# Patient Record
Sex: FEMALE | Race: WHITE | NOT HISPANIC OR LATINO | Employment: FULL TIME | URBAN - METROPOLITAN AREA
[De-identification: names, ages, dates, MRNs, and addresses within clinical notes are randomized per-mention and may not be internally consistent; named-entity substitution may affect disease eponyms.]

---

## 2017-06-16 ENCOUNTER — TELEPHONE (OUTPATIENT)
Dept: OBSTETRICS AND GYNECOLOGY | Facility: CLINIC | Age: 47
End: 2017-06-16

## 2017-06-16 NOTE — TELEPHONE ENCOUNTER
----- Message from Joann Castaneda sent at 6/16/2017  2:14 PM CDT -----  Pt would like to get a refill on her birth control call to [facility] on [address]. Pt can be reach at [phone].

## 2017-07-03 ENCOUNTER — PATIENT OUTREACH (OUTPATIENT)
Dept: ADMINISTRATIVE | Facility: HOSPITAL | Age: 47
End: 2017-07-03

## 2017-07-14 ENCOUNTER — OFFICE VISIT (OUTPATIENT)
Dept: OBSTETRICS AND GYNECOLOGY | Facility: CLINIC | Age: 47
End: 2017-07-14
Payer: COMMERCIAL

## 2017-07-14 VITALS
BODY MASS INDEX: 25.12 KG/M2 | HEIGHT: 67 IN | DIASTOLIC BLOOD PRESSURE: 68 MMHG | SYSTOLIC BLOOD PRESSURE: 100 MMHG | WEIGHT: 160.06 LBS

## 2017-07-14 DIAGNOSIS — Z01.419 ENCOUNTER FOR GYNECOLOGICAL EXAMINATION WITHOUT ABNORMAL FINDING: ICD-10-CM

## 2017-07-14 DIAGNOSIS — Z30.09 ENCOUNTER FOR OTHER GENERAL COUNSELING OR ADVICE ON CONTRACEPTION: ICD-10-CM

## 2017-07-14 DIAGNOSIS — Z12.39 BREAST SCREENING: Primary | ICD-10-CM

## 2017-07-14 PROCEDURE — 99999 PR PBB SHADOW E&M-EST. PATIENT-LVL III: CPT | Mod: PBBFAC,,, | Performed by: OBSTETRICS & GYNECOLOGY

## 2017-07-14 PROCEDURE — 99386 PREV VISIT NEW AGE 40-64: CPT | Mod: S$GLB,,, | Performed by: OBSTETRICS & GYNECOLOGY

## 2017-07-14 RX ORDER — ETONOGESTREL AND ETHINYL ESTRADIOL VAGINAL RING .015; .12 MG/D; MG/D
1 RING VAGINAL
Qty: 1 EACH | Refills: 11 | Status: SHIPPED | OUTPATIENT
Start: 2017-07-14 | End: 2018-07-18 | Stop reason: SDUPTHER

## 2017-07-14 NOTE — PROGRESS NOTES
Subjective:       Patient ID: Anita Brock is a 46 y.o. female.    Chief Complaint:  Annual Exam      History of Present Illness  HPI  The patient presents for exam with no complaints, regular menses, no gyn issues  Contraceptive measures are addressed. On Nuvaring with no problems and will continue    Preventive testing reviewed and discussed.  Pap up to date, Mammogram due  Recent history of maternal postmenopausal breast cancer diagnosed         GYN & OB History  Patient's last menstrual period was 2017.   Date of Last Pap: 6/3/2015    OB History    Para Term  AB Living   2 2 2     2   SAB TAB Ectopic Multiple Live Births           2      # Outcome Date GA Lbr Oscar/2nd Weight Sex Delivery Anes PTL Lv   2 Term 04 40w0d  3.912 kg (8 lb 10 oz) M Vag-Spont EPI N DEBORA   1 Term 11/15/98 40w0d  3.997 kg (8 lb 13 oz) M Vag-Spont EPI N DEBORA          Review of Systems  Review of Systems   Constitutional: Negative for appetite change, chills, fatigue, fever and unexpected weight change.   HENT: Negative.    Eyes: Negative for visual disturbance.   Respiratory: Negative for shortness of breath and wheezing.    Cardiovascular: Negative for chest pain, palpitations and leg swelling.   Gastrointestinal: Negative for abdominal pain, bloating, blood in stool, constipation, diarrhea, nausea and vomiting.   Endocrine: Negative for hair loss, hot flashes and hypothyroidism.   Genitourinary: Negative for dyspareunia, dysuria, flank pain, frequency, genital sores, hematuria, menorrhagia, menstrual problem, pelvic pain, urgency, vaginal bleeding, vaginal discharge, dysmenorrhea, urinary incontinence and vaginal odor.   Musculoskeletal: Negative for back pain, joint swelling and myalgias.   Skin:  Negative for rash and hair changes.   Neurological: Negative for syncope and headaches.   Hematological: Negative for adenopathy. Does not bruise/bleed easily.   Psychiatric/Behavioral: Negative for depression  and sleep disturbance. The patient is not nervous/anxious.    Breast: Negative for breast mass, breast pain, nipple discharge and skin changes          Objective:    Physical Exam:   Constitutional: She appears well-developed and well-nourished. No distress.      Neck: No JVD present. No thyroid mass and no thyromegaly present.    Cardiovascular: Normal rate and regular rhythm.     Pulmonary/Chest: Right breast exhibits no inverted nipple, no mass, no nipple discharge, no skin change, no tenderness, no bleeding and no swelling. Left breast exhibits no inverted nipple, no mass, no nipple discharge, no skin change, no tenderness, no bleeding and no swelling.        Abdominal: Soft. Normal appearance and bowel sounds are normal. There is no hepatosplenomegaly. No hernia. Hernia confirmed negative in the ventral area, confirmed negative in the right inguinal area and confirmed negative in the left inguinal area.     Genitourinary: Rectum normal, vagina normal and uterus normal. There is no rash, tenderness, lesion or injury on the right labia. There is no rash, tenderness, lesion or injury on the left labia. Uterus is not deviated, not enlarged, not fixed, not tender and not experiencing uterine prolapse. Cervix is normal. Right adnexum displays no mass, no tenderness and no fullness. Left adnexum displays no mass, no tenderness and no fullness. No erythema, tenderness or bleeding in the vagina. No foreign body in the vagina. No vaginal discharge found. Labial bartholins normal.Cervix exhibits no motion tenderness, no discharge and no friability.                        Assessment:        1. Breast screening    2. Encounter for other general counseling or advice on contraception    3. Encounter for gynecological examination without abnormal finding                Plan:      Anita was seen today for annual exam.    Diagnoses and all orders for this visit:    Breast screening  -     Mammo Digital Screening Bilat with CAD;  Future    Encounter for other general counseling or advice on contraception  -     etonogestrel-ethinyl estradiol (NUVARING) 0.12-0.015 mg/24 hr vaginal ring; Place 1 each vaginally every 28 days.    Encounter for gynecological examination without abnormal finding

## 2017-07-31 ENCOUNTER — HOSPITAL ENCOUNTER (OUTPATIENT)
Dept: RADIOLOGY | Facility: HOSPITAL | Age: 47
Discharge: HOME | End: 2017-07-31
Attending: OBSTETRICS & GYNECOLOGY
Payer: COMMERCIAL

## 2017-07-31 VITALS — BODY MASS INDEX: 25.11 KG/M2 | HEIGHT: 67 IN | WEIGHT: 160 LBS

## 2017-07-31 DIAGNOSIS — Z12.39 BREAST SCREENING: ICD-10-CM

## 2017-07-31 PROCEDURE — 77063 BREAST TOMOSYNTHESIS BI: CPT | Mod: 26,,, | Performed by: RADIOLOGY

## 2017-07-31 PROCEDURE — 77067 SCR MAMMO BI INCL CAD: CPT | Mod: 26,,, | Performed by: RADIOLOGY

## 2017-07-31 PROCEDURE — 77067 SCR MAMMO BI INCL CAD: CPT | Mod: TC

## 2018-07-18 DIAGNOSIS — Z30.09 ENCOUNTER FOR OTHER GENERAL COUNSELING OR ADVICE ON CONTRACEPTION: ICD-10-CM

## 2018-07-18 DIAGNOSIS — Z12.39 BREAST CANCER SCREENING: Primary | ICD-10-CM

## 2018-07-18 RX ORDER — ETONOGESTREL AND ETHINYL ESTRADIOL VAGINAL RING .015; .12 MG/D; MG/D
1 RING VAGINAL
Qty: 1 EACH | Refills: 11 | Status: SHIPPED | OUTPATIENT
Start: 2018-07-18 | End: 2018-08-07 | Stop reason: SDUPTHER

## 2018-07-18 NOTE — TELEPHONE ENCOUNTER
Spoke with pt. Pt requesting refill on Nuvaring. Last annual 7/14/17. Scheduled for mammogram and annual with Dr. JAIME Frey for 8/7/18. Pharmacy verified. Orders pended. Please advise.

## 2018-08-07 ENCOUNTER — HOSPITAL ENCOUNTER (OUTPATIENT)
Dept: RADIOLOGY | Facility: HOSPITAL | Age: 48
Discharge: HOME | End: 2018-08-07
Attending: OBSTETRICS & GYNECOLOGY
Payer: COMMERCIAL

## 2018-08-07 ENCOUNTER — OFFICE VISIT (OUTPATIENT)
Dept: OBSTETRICS AND GYNECOLOGY | Facility: CLINIC | Age: 48
End: 2018-08-07
Payer: COMMERCIAL

## 2018-08-07 VITALS
BODY MASS INDEX: 25.11 KG/M2 | HEIGHT: 67 IN | HEIGHT: 67 IN | WEIGHT: 160 LBS | BODY MASS INDEX: 25.74 KG/M2 | DIASTOLIC BLOOD PRESSURE: 62 MMHG | SYSTOLIC BLOOD PRESSURE: 116 MMHG | WEIGHT: 164 LBS

## 2018-08-07 DIAGNOSIS — Z12.39 BREAST CANCER SCREENING: ICD-10-CM

## 2018-08-07 DIAGNOSIS — Z30.09 ENCOUNTER FOR OTHER GENERAL COUNSELING OR ADVICE ON CONTRACEPTION: ICD-10-CM

## 2018-08-07 DIAGNOSIS — Z12.4 PAP SMEAR FOR CERVICAL CANCER SCREENING: Primary | ICD-10-CM

## 2018-08-07 DIAGNOSIS — Z01.419 ENCOUNTER FOR GYNECOLOGICAL EXAMINATION WITHOUT ABNORMAL FINDING: ICD-10-CM

## 2018-08-07 PROCEDURE — 88175 CYTOPATH C/V AUTO FLUID REDO: CPT

## 2018-08-07 PROCEDURE — 77063 BREAST TOMOSYNTHESIS BI: CPT | Mod: TC

## 2018-08-07 PROCEDURE — 99396 PREV VISIT EST AGE 40-64: CPT | Mod: S$GLB,,, | Performed by: OBSTETRICS & GYNECOLOGY

## 2018-08-07 PROCEDURE — 77063 BREAST TOMOSYNTHESIS BI: CPT | Mod: 26,,, | Performed by: RADIOLOGY

## 2018-08-07 PROCEDURE — 77067 SCR MAMMO BI INCL CAD: CPT | Mod: TC

## 2018-08-07 PROCEDURE — 99999 PR PBB SHADOW E&M-EST. PATIENT-LVL III: CPT | Mod: PBBFAC,,, | Performed by: OBSTETRICS & GYNECOLOGY

## 2018-08-07 PROCEDURE — 77067 SCR MAMMO BI INCL CAD: CPT | Mod: 26,,, | Performed by: RADIOLOGY

## 2018-08-07 RX ORDER — ETONOGESTREL AND ETHINYL ESTRADIOL VAGINAL RING .015; .12 MG/D; MG/D
1 RING VAGINAL
Qty: 1 EACH | Refills: 11 | Status: SHIPPED | OUTPATIENT
Start: 2018-08-07 | End: 2019-08-20 | Stop reason: SDUPTHER

## 2018-08-07 NOTE — PROGRESS NOTES
Subjective:       Patient ID: Anita Brock is a 47 y.o. female.    Chief Complaint:  Annual Exam      History of Present Illness  HPI  The patient presents for exam with no complaints, regular menses, no gyn issues  Contraceptive measures are addressed. On Nuvaring for years, doing well, will continue  Preventive testing reviewed and discussed.        GYN & OB History  Patient's last menstrual period was 2018 (approximate).   Date of Last Pap: 6/3/2015    OB History    Para Term  AB Living   2 2 2     2   SAB TAB Ectopic Multiple Live Births           2      # Outcome Date GA Lbr Oscar/2nd Weight Sex Delivery Anes PTL Lv   2 Term 04 40w0d  3.912 kg (8 lb 10 oz) M Vag-Spont EPI N DEBORA   1 Term 11/15/98 40w0d  3.997 kg (8 lb 13 oz) M Vag-Spont EPI N DEBORA          Review of Systems  Review of Systems   Constitutional: Negative for appetite change, chills, fatigue, fever and unexpected weight change.   HENT: Negative.    Eyes: Negative for visual disturbance.   Respiratory: Negative for shortness of breath and wheezing.    Cardiovascular: Negative for chest pain, palpitations and leg swelling.   Gastrointestinal: Negative for abdominal pain, bloating, blood in stool, constipation, diarrhea, nausea and vomiting.   Endocrine: Negative for hair loss, hot flashes and hypothyroidism.   Genitourinary: Negative for dyspareunia, dysuria, flank pain, frequency, genital sores, hematuria, menorrhagia, menstrual problem, pelvic pain, urgency, vaginal bleeding, vaginal discharge, dysmenorrhea, urinary incontinence and vaginal odor.   Musculoskeletal: Negative for back pain, joint swelling and myalgias.   Skin:  Negative for rash and hair changes.   Neurological: Negative for syncope and headaches.   Hematological: Negative for adenopathy. Does not bruise/bleed easily.   Psychiatric/Behavioral: Negative for depression and sleep disturbance. The patient is not nervous/anxious.    Breast: Negative for  breast mass, breast pain, nipple discharge and skin changes          Objective:    Physical Exam:   Constitutional: She appears well-developed and well-nourished. No distress.      Neck: No JVD present. No thyroid mass and no thyromegaly present.    Cardiovascular: Normal rate and regular rhythm.          Abdominal: Soft. Normal appearance and bowel sounds are normal. There is no hepatosplenomegaly. No hernia. Hernia confirmed negative in the ventral area, confirmed negative in the right inguinal area and confirmed negative in the left inguinal area.     Genitourinary: Rectum normal, vagina normal and uterus normal. There is no rash, tenderness, lesion or injury on the right labia. There is no rash, tenderness, lesion or injury on the left labia. Uterus is not deviated, not enlarged, not fixed, not tender and not experiencing uterine prolapse. Cervix is normal. Right adnexum displays no mass, no tenderness and no fullness. Left adnexum displays no mass, no tenderness and no fullness. No erythema, tenderness or bleeding in the vagina. No foreign body in the vagina. No vaginal discharge found. Labial bartholins normal.Cervix exhibits no motion tenderness, no discharge and no friability. Additional cervical findings: pap smear done                       Assessment:        1. Pap smear for cervical cancer screening    2. Encounter for other general counseling or advice on contraception    3. Encounter for gynecological examination without abnormal finding                Plan:      Anita was seen today for annual exam.    Diagnoses and all orders for this visit:    Pap smear for cervical cancer screening  -     Liquid-based pap smear, screening    Encounter for other general counseling or advice on contraception  -     etonogestrel-ethinyl estradiol (NUVARING) 0.12-0.015 mg/24 hr vaginal ring; Place 1 each vaginally every 28 days.    Encounter for gynecological examination without abnormal finding

## 2019-06-26 ENCOUNTER — TELEPHONE (OUTPATIENT)
Dept: OBSTETRICS AND GYNECOLOGY | Facility: CLINIC | Age: 49
End: 2019-06-26

## 2019-06-26 NOTE — TELEPHONE ENCOUNTER
----- Message from Solitario Jose sent at 6/26/2019  3:37 PM CDT -----  Contact: self [phone]  PT would like to schedule appt with Dr. David Frey. Please call back at [phone].  Md Toney

## 2019-08-06 ENCOUNTER — TELEPHONE (OUTPATIENT)
Dept: OBSTETRICS AND GYNECOLOGY | Facility: CLINIC | Age: 49
End: 2019-08-06

## 2019-08-06 DIAGNOSIS — Z12.39 BREAST CANCER SCREENING: Primary | ICD-10-CM

## 2019-08-06 NOTE — TELEPHONE ENCOUNTER
Spoke to patient and scheduled her appointment for her mammogram and annual. Patient verbalized understanding to dates, times, and location.

## 2019-08-06 NOTE — TELEPHONE ENCOUNTER
----- Message from Sonya Sutherland sent at 8/6/2019  8:25 AM CDT -----  Contact: self  Patient requesting a call back to schedule WWE with Dr. JEREMY Frey. Please call patient back at [phone]

## 2019-08-09 ENCOUNTER — TELEPHONE (OUTPATIENT)
Dept: OBSTETRICS AND GYNECOLOGY | Facility: CLINIC | Age: 49
End: 2019-08-09

## 2019-08-09 NOTE — TELEPHONE ENCOUNTER
----- Message from Izzy Mccall sent at 8/9/2019  9:26 AM CDT -----  Contact: pt   Call pt in regards to rescheduling her appt.    [phone] (home)

## 2019-08-20 ENCOUNTER — OFFICE VISIT (OUTPATIENT)
Dept: OBSTETRICS AND GYNECOLOGY | Facility: CLINIC | Age: 49
End: 2019-08-20
Payer: COMMERCIAL

## 2019-08-20 VITALS
WEIGHT: 163.38 LBS | SYSTOLIC BLOOD PRESSURE: 130 MMHG | DIASTOLIC BLOOD PRESSURE: 72 MMHG | HEIGHT: 67 IN | BODY MASS INDEX: 25.64 KG/M2

## 2019-08-20 DIAGNOSIS — Z30.09 ENCOUNTER FOR OTHER GENERAL COUNSELING OR ADVICE ON CONTRACEPTION: ICD-10-CM

## 2019-08-20 DIAGNOSIS — Z01.419 ENCOUNTER FOR GYNECOLOGICAL EXAMINATION WITHOUT ABNORMAL FINDING: Primary | ICD-10-CM

## 2019-08-20 PROCEDURE — 99999 PR PBB SHADOW E&M-EST. PATIENT-LVL II: ICD-10-PCS | Mod: PBBFAC,,, | Performed by: OBSTETRICS & GYNECOLOGY

## 2019-08-20 PROCEDURE — 99396 PREV VISIT EST AGE 40-64: CPT | Mod: S$GLB,,, | Performed by: OBSTETRICS & GYNECOLOGY

## 2019-08-20 PROCEDURE — 99999 PR PBB SHADOW E&M-EST. PATIENT-LVL II: CPT | Mod: PBBFAC,,, | Performed by: OBSTETRICS & GYNECOLOGY

## 2019-08-20 PROCEDURE — 99396 PR PREVENTIVE VISIT,EST,40-64: ICD-10-PCS | Mod: S$GLB,,, | Performed by: OBSTETRICS & GYNECOLOGY

## 2019-08-20 RX ORDER — ETONOGESTREL AND ETHINYL ESTRADIOL VAGINAL RING .015; .12 MG/D; MG/D
1 RING VAGINAL
Qty: 1 EACH | Refills: 11 | Status: SHIPPED | OUTPATIENT
Start: 2019-08-20 | End: 2020-09-02 | Stop reason: SDUPTHER

## 2019-08-20 NOTE — PROGRESS NOTES
Subjective:       Patient ID: Anita Brock is a 48 y.o. female.    Chief Complaint:  Well Woman      History of Present Illness  HPI  The patient presents for exam with no complaints, regular menses, no gyn issues  Contraceptive measures are addressed.   Preventive testing reviewed and discussed.      Health Maintenance   Topic Date Due    Lipid Panel  1972    TETANUS VACCINE  1990    Mammogram  2020    Pap Smear with HPV Cotest  2021     GYN & OB History  Patient's last menstrual period was 2019.   Date of Last Pap: 2018    OB History    Para Term  AB Living   2 2 2     2   SAB TAB Ectopic Multiple Live Births           2      # Outcome Date GA Lbr Oscar/2nd Weight Sex Delivery Anes PTL Lv   2 Term 04 40w0d  3.912 kg (8 lb 10 oz) M Vag-Spont EPI N DEBORA   1 Term 11/15/98 40w0d  3.997 kg (8 lb 13 oz) M Vag-Spont EPI N DEBORA       Review of Systems  Review of Systems   Constitutional: Negative for appetite change, chills, fatigue, fever and unexpected weight change.   HENT: Negative.    Eyes: Negative for visual disturbance.   Respiratory: Negative for shortness of breath and wheezing.    Cardiovascular: Negative for chest pain, palpitations and leg swelling.   Gastrointestinal: Negative for abdominal pain, bloating, blood in stool, constipation, diarrhea, nausea and vomiting.   Endocrine: Negative for hair loss, hot flashes and hypothyroidism.   Genitourinary: Negative for dysmenorrhea, dyspareunia, dysuria, flank pain, frequency, genital sores, hematuria, menorrhagia, menstrual problem, pelvic pain, urgency, vaginal bleeding, vaginal discharge, urinary incontinence and vaginal odor.   Musculoskeletal: Negative for back pain, joint swelling and myalgias.   Integumentary:  Negative for rash, hair changes, breast mass, nipple discharge and breast skin changes.   Neurological: Negative for syncope and headaches.   Hematological: Negative for adenopathy. Does  not bruise/bleed easily.   Psychiatric/Behavioral: Negative for depression and sleep disturbance. The patient is not nervous/anxious.    Breast: Negative for mass, mastodynia, nipple discharge and skin changes          Objective:   Physical Exam:   Constitutional: She appears well-developed and well-nourished. No distress.      Neck: No JVD present. No thyroid mass and no thyromegaly present.    Cardiovascular: Normal rate and regular rhythm.          Abdominal: Soft. Normal appearance and bowel sounds are normal. There is no hepatosplenomegaly. No hernia. Hernia confirmed negative in the ventral area, confirmed negative in the right inguinal area and confirmed negative in the left inguinal area.     Genitourinary: Rectum normal, vagina normal and uterus normal. There is no rash, tenderness, lesion or injury on the right labia. There is no rash, tenderness, lesion or injury on the left labia. Uterus is not deviated, not enlarged, not fixed, not tender and not experiencing uterine prolapse. Cervix is normal. Right adnexum displays no mass, no tenderness and no fullness. Left adnexum displays no mass, no tenderness and no fullness. No erythema, tenderness or bleeding in the vagina. No foreign body in the vagina. No vaginal discharge found. Labial bartholins normal.Cervix exhibits no motion tenderness, no discharge and no friability.                      Assessment:        1. Encounter for gynecological examination without abnormal finding    2. Encounter for other general counseling or advice on contraception                Plan:            Anita was seen today for well woman.    Diagnoses and all orders for this visit:    Encounter for gynecological examination without abnormal finding    Encounter for other general counseling or advice on contraception  -     etonogestrel-ethinyl estradiol (NUVARING) 0.12-0.015 mg/24 hr vaginal ring; Place 1 each vaginally every 28 days.

## 2019-09-24 ENCOUNTER — HOSPITAL ENCOUNTER (OUTPATIENT)
Dept: RADIOLOGY | Facility: HOSPITAL | Age: 49
Discharge: HOME | End: 2019-09-24
Attending: OBSTETRICS & GYNECOLOGY
Payer: COMMERCIAL

## 2019-09-24 DIAGNOSIS — Z12.39 BREAST CANCER SCREENING: ICD-10-CM

## 2019-09-24 PROCEDURE — 77067 SCR MAMMO BI INCL CAD: CPT | Mod: 26,,, | Performed by: RADIOLOGY

## 2019-09-24 PROCEDURE — 77063 BREAST TOMOSYNTHESIS BI: CPT | Mod: 26,,, | Performed by: RADIOLOGY

## 2019-09-24 PROCEDURE — 77063 MAMMO DIGITAL SCREENING BILAT WITH TOMOSYNTHESIS_CAD: ICD-10-PCS | Mod: 26,,, | Performed by: RADIOLOGY

## 2019-09-24 PROCEDURE — 77067 SCR MAMMO BI INCL CAD: CPT | Mod: TC

## 2019-09-24 PROCEDURE — 77067 MAMMO DIGITAL SCREENING BILAT WITH TOMOSYNTHESIS_CAD: ICD-10-PCS | Mod: 26,,, | Performed by: RADIOLOGY

## 2020-09-02 ENCOUNTER — TELEPHONE (OUTPATIENT)
Dept: OBSTETRICS AND GYNECOLOGY | Facility: CLINIC | Age: 50
End: 2020-09-02

## 2020-09-02 ENCOUNTER — OFFICE VISIT (OUTPATIENT)
Dept: OBSTETRICS AND GYNECOLOGY | Facility: CLINIC | Age: 50
End: 2020-09-02
Payer: COMMERCIAL

## 2020-09-02 VITALS
DIASTOLIC BLOOD PRESSURE: 82 MMHG | WEIGHT: 160.94 LBS | HEIGHT: 66 IN | SYSTOLIC BLOOD PRESSURE: 124 MMHG | BODY MASS INDEX: 25.86 KG/M2

## 2020-09-02 DIAGNOSIS — Z80.3 FAMILY HISTORY OF BREAST CANCER IN MOTHER: ICD-10-CM

## 2020-09-02 DIAGNOSIS — Z12.31 VISIT FOR SCREENING MAMMOGRAM: Primary | ICD-10-CM

## 2020-09-02 DIAGNOSIS — Z30.09 ENCOUNTER FOR OTHER GENERAL COUNSELING OR ADVICE ON CONTRACEPTION: ICD-10-CM

## 2020-09-02 PROCEDURE — 99999 PR PBB SHADOW E&M-EST. PATIENT-LVL III: ICD-10-PCS | Mod: PBBFAC,,, | Performed by: OBSTETRICS & GYNECOLOGY

## 2020-09-02 PROCEDURE — 3008F PR BODY MASS INDEX (BMI) DOCUMENTED: ICD-10-PCS | Mod: CPTII,S$GLB,, | Performed by: OBSTETRICS & GYNECOLOGY

## 2020-09-02 PROCEDURE — 99396 PREV VISIT EST AGE 40-64: CPT | Mod: S$GLB,,, | Performed by: OBSTETRICS & GYNECOLOGY

## 2020-09-02 PROCEDURE — 99396 PR PREVENTIVE VISIT,EST,40-64: ICD-10-PCS | Mod: S$GLB,,, | Performed by: OBSTETRICS & GYNECOLOGY

## 2020-09-02 PROCEDURE — 3008F BODY MASS INDEX DOCD: CPT | Mod: CPTII,S$GLB,, | Performed by: OBSTETRICS & GYNECOLOGY

## 2020-09-02 PROCEDURE — 99999 PR PBB SHADOW E&M-EST. PATIENT-LVL III: CPT | Mod: PBBFAC,,, | Performed by: OBSTETRICS & GYNECOLOGY

## 2020-09-02 RX ORDER — ETONOGESTREL AND ETHINYL ESTRADIOL VAGINAL RING .015; .12 MG/D; MG/D
1 RING VAGINAL
Qty: 1 EACH | Refills: 11 | Status: SHIPPED | OUTPATIENT
Start: 2020-09-02 | End: 2021-09-03 | Stop reason: SDUPTHER

## 2020-09-02 NOTE — TELEPHONE ENCOUNTER
----- Message from Anamika Mcgrath LPN sent at 9/2/2020  1:25 PM CDT -----  done  ----- Message -----  From: Kaylee Villanueva LPN  Sent: 9/2/2020  11:42 AM CDT  To: [facility] Staff    Dr. David Frey would like this pt to see [place] for increased risk of breast cancer.  Can someone assist with making an appointment for her?  The pt has a screening mammo scheduled for 9/24/2020, but does not know if she should keep that appointment, or will something different be ordered.  Please advise.  Thanks in advance, Kaylin

## 2020-09-02 NOTE — PROGRESS NOTES
Subjective:       Patient ID: Anita Brock is a 50 y.o. female.    Chief Complaint:  Gynecologic Exam (annual exam and has no complaints)      History of Present Illness  HPI  The patient presents for exam with no complaints, regular menses, no gyn issues  Contraceptive measures are addressed. On Nuvaring and will continue  Preventive testing reviewed and discussed.  Discussed increase risk of breast cancer as calculated with TC score of 24 % over the past 3 screenings.  Only identifiable risk is maternal history of stage 0 breast cancer post menopause        Health Maintenance   Topic Date Due    Hepatitis C Screening  1972    Lipid Panel  1972    TETANUS VACCINE  1990    Mammogram  2021     GYN & OB History  Patient's last menstrual period was 2020 (approximate).   Date of Last Pap: 2018    OB History    Para Term  AB Living   2 2 2     2   SAB TAB Ectopic Multiple Live Births           2      # Outcome Date GA Lbr Oscar/2nd Weight Sex Delivery Anes PTL Lv   2 Term 04 40w0d  3.912 kg (8 lb 10 oz) M Vag-Spont EPI N DEBORA   1 Term 11/15/98 40w0d  3.997 kg (8 lb 13 oz) M Vag-Spont EPI N DEBORA       Review of Systems  Review of Systems   Constitutional: Negative for appetite change, chills, fatigue, fever and unexpected weight change.   HENT: Negative.    Eyes: Negative for visual disturbance.   Respiratory: Negative for shortness of breath and wheezing.    Cardiovascular: Negative for chest pain, palpitations and leg swelling.   Gastrointestinal: Negative for abdominal pain, bloating, blood in stool, constipation, diarrhea, nausea and vomiting.   Endocrine: Negative for hair loss, hot flashes and hypothyroidism.   Genitourinary: Negative for dysmenorrhea, dyspareunia, dysuria, flank pain, frequency, genital sores, hematuria, menorrhagia, menstrual problem, pelvic pain, urgency, vaginal bleeding, vaginal discharge, urinary incontinence and vaginal odor.    Musculoskeletal: Negative for back pain, joint swelling and myalgias.   Integumentary:  Negative for rash, hair changes, breast mass, nipple discharge and breast skin changes.   Neurological: Negative for syncope and headaches.   Hematological: Negative for adenopathy. Does not bruise/bleed easily.   Psychiatric/Behavioral: Negative for depression and sleep disturbance. The patient is not nervous/anxious.    Breast: Negative for mass, mastodynia, nipple discharge and skin changes          Objective:   Physical Exam:   Constitutional: She appears well-developed and well-nourished. No distress.      Neck: No JVD present. No thyroid mass and no thyromegaly present.    Cardiovascular: Normal rate and regular rhythm.          Abdominal: Soft. Normal appearance and bowel sounds are normal. No hernia. Hernia confirmed negative in the ventral area, confirmed negative in the right inguinal area and confirmed negative in the left inguinal area.     Genitourinary:    Vagina, uterus and rectum normal.   There is no rash, tenderness, lesion or injury on the right labia. There is no rash, tenderness, lesion or injury on the left labia. Uterus is not deviated, not enlarged, not fixed, not tender and not experiencing uterine prolapse. Cervix is normal. Right adnexum displays no mass, no tenderness and no fullness. Left adnexum displays no mass, no tenderness and no fullness. No erythema, tenderness or bleeding in the vagina.    No foreign body in the vagina.   Labial bartholins normal.Cervix exhibits no motion tenderness, no discharge and no friability. negative for vaginal discharge                     Assessment:        1. Visit for screening mammogram    2. Encounter for other general counseling or advice on contraception    3. Family history of breast cancer in mother                Plan:            Anita was seen today for gynecologic exam.    Diagnoses and all orders for this visit:    Visit for screening mammogram  -      Mammo Digital Screening Bilat w/ [place]; Future    Encounter for other general counseling or advice on contraception  -     etonogestreL-ethinyl estradioL (NUVARING) 0.12-0.015 mg/24 hr vaginal ring; Place 1 each vaginally every 28 days.    Family history of breast cancer in mother  Comments:  TC risk 24 % over the past 3 screening exams   Advise to visit with Breast Screening clinic to discuss future screening options.

## 2020-09-02 NOTE — TELEPHONE ENCOUNTER
Pt informed and voiced understanding of date, time and location of appt with Nahomy Beckhamcott.  PAULA, ERINN

## 2020-09-24 ENCOUNTER — HOSPITAL ENCOUNTER (OUTPATIENT)
Dept: RADIOLOGY | Facility: HOSPITAL | Age: 50
Discharge: HOME | End: 2020-09-24
Attending: OBSTETRICS & GYNECOLOGY
Payer: COMMERCIAL

## 2020-09-24 VITALS — HEIGHT: 66 IN | BODY MASS INDEX: 25.86 KG/M2 | WEIGHT: 160.94 LBS

## 2020-09-24 DIAGNOSIS — Z12.31 VISIT FOR SCREENING MAMMOGRAM: ICD-10-CM

## 2020-09-24 PROCEDURE — 77063 MAMMO DIGITAL SCREENING BILAT WITH TOMOSYNTHESIS_CAD: ICD-10-PCS | Mod: 26,,, | Performed by: RADIOLOGY

## 2020-09-24 PROCEDURE — 77067 SCR MAMMO BI INCL CAD: CPT | Mod: 26,,, | Performed by: RADIOLOGY

## 2020-09-24 PROCEDURE — 77067 SCR MAMMO BI INCL CAD: CPT | Mod: TC

## 2020-09-24 PROCEDURE — 77067 MAMMO DIGITAL SCREENING BILAT WITH TOMOSYNTHESIS_CAD: ICD-10-PCS | Mod: 26,,, | Performed by: RADIOLOGY

## 2020-09-24 PROCEDURE — 77063 BREAST TOMOSYNTHESIS BI: CPT | Mod: 26,,, | Performed by: RADIOLOGY

## 2020-09-28 ENCOUNTER — HOSPITAL ENCOUNTER (OUTPATIENT)
Dept: RADIOLOGY | Facility: HOSPITAL | Age: 50
Discharge: HOME | End: 2020-09-28
Attending: OBSTETRICS & GYNECOLOGY
Payer: COMMERCIAL

## 2020-09-28 DIAGNOSIS — R92.8 ABNORMAL MAMMOGRAM: ICD-10-CM

## 2020-09-28 PROCEDURE — 76642 ULTRASOUND BREAST LIMITED: CPT | Mod: TC,LT

## 2020-09-28 PROCEDURE — 76642 US BREAST LEFT LIMITED: ICD-10-PCS | Mod: 26,LT,, | Performed by: RADIOLOGY

## 2020-09-28 PROCEDURE — 76642 ULTRASOUND BREAST LIMITED: CPT | Mod: 26,LT,, | Performed by: RADIOLOGY

## 2020-09-29 ENCOUNTER — PATIENT MESSAGE (OUTPATIENT)
Dept: OBSTETRICS AND GYNECOLOGY | Facility: CLINIC | Age: 50
End: 2020-09-29

## 2020-10-15 NOTE — PROGRESS NOTES
Patient ID: Anita Brock is a 50 y.o. female.    Chief Complaint: elevated risk for breast cancer      HPI: Patient presents for the evaluation of an elevated breast cancer risk assessment score of 23.83%. Pt is referred by David Frey MD    Risk factors identified:     Menarche at 15  G 2 P 2  First pregnancy at 28  LMP: 9/20/2020  Estrogen: none  Radiation to the neck or chest wall- none  Prior breast biopsies or atypical hyperplasia- none    ETOH: glass a wine once a week     FH: mother breast cancer - Stage 0 - 71 y/o, son leukemia at 6 y/o, maternal uncle colon cancer- 60's      Body mass index is 25.83 kg/m².    Review of Systems   Constitutional: Negative.    HENT: Negative.    Eyes: Negative.    Respiratory: Negative.    Cardiovascular: Negative.    Gastrointestinal: Negative.         No reflux   Endocrine: Negative.    Genitourinary: Negative.    Musculoskeletal: Negative.    Skin: Negative.    Allergic/Immunologic: Negative.    Neurological: Negative.    Hematological: Negative.  Negative for adenopathy.   Psychiatric/Behavioral: Negative.      Breast: Pt denies any breast pain, nipple discharge, or palpable mass. No prior trauma or bruising. No breast surgeries or abnormalities.    Current Outpatient Medications   Medication Sig Dispense Refill    etonogestreL-ethinyl estradioL (NUVARING) 0.12-0.015 mg/24 hr vaginal ring Place 1 each vaginally every 28 days. 1 each 11     No current facility-administered medications for this visit.        Review of patient's allergies indicates:  No Known Allergies    History reviewed. No pertinent past medical history.    Past Surgical History:   Procedure Laterality Date    Dislocated Left Patella         Family History   Problem Relation Age of Onset    Leukemia Son     Hypertension Mother     Breast cancer Mother     Thrombosis Mother         DVT in leg    Colon cancer Maternal Uncle     Ovarian cancer Neg Hx        Social History      Socioeconomic History    Marital status:      Spouse name: Not on file    Number of children: Not on file    Years of education: Not on file    Highest education level: Not on file   Occupational History    Not on file   Social Needs    Financial resource strain: Not on file    Food insecurity     Worry: Not on file     Inability: Not on file    Transportation needs     Medical: Not on file     Non-medical: Not on file   Tobacco Use    Smoking status: Never Smoker    Smokeless tobacco: Never Used   Substance and Sexual Activity    Alcohol use: Yes     Comment: socially    Drug use: No    Sexual activity: Yes     Partners: Male     Birth control/protection: Inserts   Lifestyle    Physical activity     Days per week: Not on file     Minutes per session: Not on file    Stress: Not on file   Relationships    Social connections     Talks on phone: Not on file     Gets together: Not on file     Attends [employer] service: Not on file     Active member of club or organization: Not on file     Attends meetings of clubs or organizations: Not on file     Relationship status: Not on file   Other Topics Concern    Not on file   Social History Narrative    Not on file       Vitals:    10/20/20 0709   BP: 120/63   Pulse: 76   Resp: 18   Temp: 97.1 °F (36.2 °C)       Physical Exam  Constitutional:       Appearance: She is well-developed.   HENT:      Head: Normocephalic and atraumatic.      Right Ear: External ear normal.      Left Ear: External ear normal.      Mouth/Throat:      Pharynx: No oropharyngeal exudate.   Eyes:      General: No scleral icterus.        Right eye: No discharge.         Left eye: No discharge.      Conjunctiva/sclera: Conjunctivae normal.      Pupils: Pupils are equal, round, and reactive to light.   Neck:      Musculoskeletal: Normal range of motion and neck supple.      Thyroid: No thyromegaly.   Cardiovascular:      Rate and Rhythm: Normal rate and regular rhythm.      Heart  sounds: Normal heart sounds.   Pulmonary:      Effort: Pulmonary effort is normal.      Breath sounds: Normal breath sounds.   Chest:      Breasts:         Right: No inverted nipple, mass, nipple discharge, skin change or tenderness.         Left: No inverted nipple, mass, nipple discharge, skin change or tenderness.       Abdominal:      General: Bowel sounds are normal.      Palpations: Abdomen is soft.   Musculoskeletal: Normal range of motion.      Right shoulder: She exhibits no crepitus and normal strength.   Lymphadenopathy:      Head:      Right side of head: No submental, submandibular, tonsillar, preauricular, posterior auricular or occipital adenopathy.      Left side of head: No submental, submandibular, tonsillar, preauricular, posterior auricular or occipital adenopathy.      Cervical: No cervical adenopathy.      Right cervical: No superficial or posterior cervical adenopathy.     Left cervical: No superficial or posterior cervical adenopathy.      Upper Body:      Right upper body: No supraclavicular or axillary adenopathy.      Left upper body: No supraclavicular or axillary adenopathy.   Skin:     General: Skin is warm and dry.      Coloration: Skin is not pale.      Findings: No erythema or rash.   Neurological:      Mental Status: She is alert and oriented to person, place, and time.      Deep Tendon Reflexes: Reflexes are normal and symmetric.   Psychiatric:         Behavior: Behavior normal.         Thought Content: Thought content normal.         Judgment: Judgment normal.         9/28/2020  US Breast Left Limited  Narrative: Result:   US Breast Left Limited     History:  Patient is 50 y.o. and is seen for a diagnostic mammogram.    Films Compared:  Compared to: 09/24/2020 Mammo Digital Screening Bilat w/ Ridge     Findings:     There is an 8 mm x 4 mm x 8 mm simple cyst seen in the left breast at 12   o'clock. The cyst correlates with the prior mammogram finding.     There is no evidence of  suspicious masses or other abnormal findings in   the left breast.  Impression: There is no sonographic evidence of malignancy in the left breast.    BI-RADS Category:   Overall: 2 - Benign     Recommendation:  Routine screening mammogram in 1 year is recommended.    Your estimated lifetime risk of breast cancer (to age 85) based on   Tyrer-Cuzick risk assessment model is Tyrer-Cuzick: 23.83 %. According to   the American Cancer Society, patients with a lifetime breast cancer risk   of 20% or higher might benefit from supplemental screening tests.         Assessment & Plan:  Family history of breast cancer    At high risk for breast cancer  -     MRI Breast w/wo Contrast, w/CAD, Bilateral; Future; Expected date: 04/15/2021  -     Basic Metabolic Panel; Future; Expected date: 04/20/2021    Counseling and coordination of care    Counseling on health promotion and disease prevention    Encounter for breast cancer screening using non-mammogram modality  -     MRI Breast w/wo Contrast, w/CAD, Bilateral; Future; Expected date: 04/15/2021  -     Basic Metabolic Panel; Future; Expected date: 04/20/2021    Health education/counseling    Breast mass, right  -     US Breast Right Limited; Future; Expected date: 11/03/2020  -     Mammo Digital Diagnostic Right with Ridge; Future; Expected date: 11/03/2020      1. Right breast palpable prominence that is asymmetric to the left same locatioin.  2. Negative imaging- simple cyst left breast- elevated risk for breast cancer  3. Explained results of risk assessment and recommendations for additional screening with MRI in 6 months alternating with Diagnostic mammograms. Recommend right breast ultrasound in the 6 oclock position for further eval of the prominence- will notify pt of results  4. Next imaging due: March 2021 bilateral breast MRI with and without contrast and exam  5. Discussed modifiable risk factors such as diet and exercise. Reviewed diet and counseled pt regarding  eating more fruits and vegetables throughout the day.  6. Diet/Weight goals- 5 # loss by March 2021  7. Exercise:none- will try to get 60 minutes a week  8. Encouraged 30 minutes most days of the week. Explained benefits of losing a few pounds to decrease estrogen exposure from fat cells.  9. Discussed risks vs benefits of genetic testing.  - Indications for testing: not indicated at this time due to limited family history of breast cancer. Variant of undetermined significance - usually does not change follow-up recommendations or screenings unless reclassified in the future as a definitive mutation.   10. Discussed use of tamoxifen for the reduction of breast cancer risk- Information regarding risk reducing medications given to pt verbally and in writing. Pt declines at this time due to the potential for hot flashes and blood clots. Risk out weigh benefit of taking med at this time due to low score  11. BSE technique was demonstrated and explained. Related what to look and feel for on exam monthly. Pt verbalized understanding and return demonstrated proper technique and knowledge of what to look for on self-exam. Pt instructed to call if notes any changes. Contact information given.   One hour- 60 minutes was spent with this patient and 75% was spent identifying risk factors, reviewing records and educating pt regarding risk reduction strategies and planning future screenings.            Information about strategies to decrease breast cancer risk factors from Up to Date given to the pt.

## 2020-10-20 ENCOUNTER — OFFICE VISIT (OUTPATIENT)
Dept: HEMATOLOGY/ONCOLOGY | Facility: CLINIC | Age: 50
End: 2020-10-20
Payer: COMMERCIAL

## 2020-10-20 VITALS
TEMPERATURE: 97 F | HEIGHT: 66 IN | SYSTOLIC BLOOD PRESSURE: 120 MMHG | HEART RATE: 76 BPM | OXYGEN SATURATION: 99 % | BODY MASS INDEX: 25.72 KG/M2 | WEIGHT: 160.06 LBS | DIASTOLIC BLOOD PRESSURE: 63 MMHG | RESPIRATION RATE: 18 BRPM

## 2020-10-20 DIAGNOSIS — Z71.9 HEALTH EDUCATION/COUNSELING: ICD-10-CM

## 2020-10-20 DIAGNOSIS — Z91.89 AT HIGH RISK FOR BREAST CANCER: ICD-10-CM

## 2020-10-20 DIAGNOSIS — Z12.39 ENCOUNTER FOR BREAST CANCER SCREENING USING NON-MAMMOGRAM MODALITY: ICD-10-CM

## 2020-10-20 DIAGNOSIS — N63.10 BREAST MASS, RIGHT: ICD-10-CM

## 2020-10-20 DIAGNOSIS — Z71.89 COUNSELING ON HEALTH PROMOTION AND DISEASE PREVENTION: ICD-10-CM

## 2020-10-20 DIAGNOSIS — Z80.3 FAMILY HISTORY OF BREAST CANCER: Primary | ICD-10-CM

## 2020-10-20 DIAGNOSIS — Z71.89 COUNSELING AND COORDINATION OF CARE: ICD-10-CM

## 2020-10-20 PROCEDURE — 99205 PR OFFICE/OUTPT VISIT, NEW, LEVL V, 60-74 MIN: ICD-10-PCS | Mod: S$GLB,,, | Performed by: NURSE PRACTITIONER

## 2020-10-20 PROCEDURE — 3008F BODY MASS INDEX DOCD: CPT | Mod: CPTII,S$GLB,, | Performed by: NURSE PRACTITIONER

## 2020-10-20 PROCEDURE — 99999 PR PBB SHADOW E&M-EST. PATIENT-LVL IV: CPT | Mod: PBBFAC,,, | Performed by: NURSE PRACTITIONER

## 2020-10-20 PROCEDURE — 3008F PR BODY MASS INDEX (BMI) DOCUMENTED: ICD-10-PCS | Mod: CPTII,S$GLB,, | Performed by: NURSE PRACTITIONER

## 2020-10-20 PROCEDURE — 99999 PR PBB SHADOW E&M-EST. PATIENT-LVL IV: ICD-10-PCS | Mod: PBBFAC,,, | Performed by: NURSE PRACTITIONER

## 2020-10-20 PROCEDURE — 99205 OFFICE O/P NEW HI 60 MIN: CPT | Mod: S$GLB,,, | Performed by: NURSE PRACTITIONER

## 2020-10-26 ENCOUNTER — TELEPHONE (OUTPATIENT)
Dept: HEMATOLOGY/ONCOLOGY | Facility: CLINIC | Age: 50
End: 2020-10-26

## 2020-10-26 NOTE — TELEPHONE ENCOUNTER
----- Message from Sonya Sutherland sent at 10/26/2020 10:49 AM CDT -----  Contact: pt  Type:  Patient Returning Call    Who Called:Anita  Who Left Message for Patient:  Does the patient know what this is regarding?:  Would the patient rather a call back or a response via [facility]? call  Best Call Back Number:[phone]  Additional Information:

## 2020-10-26 NOTE — TELEPHONE ENCOUNTER
----- Message from Mirela Solorio sent at 10/26/2020  8:56 AM CDT -----  Hey, you booked this patient for a breast ultrasound but not  for the mammogram which has to go before ultrasound. Please call our department at ext [phone] to reschedule patient for another day because we cant get her in tomorrow.   thank you MRN: [id number]

## 2020-10-27 ENCOUNTER — HOSPITAL ENCOUNTER (OUTPATIENT)
Dept: RADIOLOGY | Facility: HOSPITAL | Age: 50
Discharge: HOME | End: 2020-10-27
Attending: NURSE PRACTITIONER
Payer: COMMERCIAL

## 2020-10-27 DIAGNOSIS — N63.10 BREAST MASS, RIGHT: ICD-10-CM

## 2020-10-27 PROCEDURE — 76642 ULTRASOUND BREAST LIMITED: CPT | Mod: TC,RT

## 2020-10-27 PROCEDURE — 77065 DX MAMMO INCL CAD UNI: CPT | Mod: 26,RT,, | Performed by: RADIOLOGY

## 2020-10-27 PROCEDURE — 76642 US BREAST RIGHT LIMITED: ICD-10-PCS | Mod: 26,RT,, | Performed by: RADIOLOGY

## 2020-10-27 PROCEDURE — 77061 BREAST TOMOSYNTHESIS UNI: CPT | Mod: 26,RT,, | Performed by: RADIOLOGY

## 2020-10-27 PROCEDURE — 77065 DX MAMMO INCL CAD UNI: CPT | Mod: TC,RT

## 2020-10-27 PROCEDURE — 76642 ULTRASOUND BREAST LIMITED: CPT | Mod: 26,RT,, | Performed by: RADIOLOGY

## 2020-10-27 PROCEDURE — 77061 BREAST TOMOSYNTHESIS UNI: CPT | Mod: TC,RT

## 2020-10-27 PROCEDURE — 77061 MAMMO DIGITAL DIAGNOSTIC RIGHT WITH TOMO: ICD-10-PCS | Mod: 26,RT,, | Performed by: RADIOLOGY

## 2020-10-27 PROCEDURE — 77065 MAMMO DIGITAL DIAGNOSTIC RIGHT WITH TOMO: ICD-10-PCS | Mod: 26,RT,, | Performed by: RADIOLOGY

## 2020-10-28 ENCOUNTER — PATIENT MESSAGE (OUTPATIENT)
Dept: HEMATOLOGY/ONCOLOGY | Facility: CLINIC | Age: 50
End: 2020-10-28

## 2021-05-04 ENCOUNTER — TELEPHONE (OUTPATIENT)
Dept: HEMATOLOGY/ONCOLOGY | Facility: CLINIC | Age: 51
End: 2021-05-04

## 2022-01-05 DIAGNOSIS — Z30.09 ENCOUNTER FOR OTHER GENERAL COUNSELING OR ADVICE ON CONTRACEPTION: ICD-10-CM

## 2022-01-05 RX ORDER — ETONOGESTREL AND ETHINYL ESTRADIOL VAGINAL RING .015; .12 MG/D; MG/D
RING VAGINAL
Qty: 1 EACH | Refills: 3 | Status: SHIPPED | OUTPATIENT
Start: 2022-01-05 | End: 2022-01-06

## 2022-01-05 NOTE — TELEPHONE ENCOUNTER
----- Message from Mariangel Dillard sent at 1/5/2022  3:24 PM CST -----  Contact: self [phone]  Type:  RX Refill Request    Who Called: Anita Brock    Refill or New Rx:Refill  RX Name and Strength:etonogestreL-ethinyl estradioL (NUVARING) 0.12-0.015 mg/24 hr vaginal ring    How is the patient currently taking it? (ex. 1XDay): Once a month  Is this a 30 day or 90 day RX:30    Preferred Pharmacy with phone number:  [id number] - [place] - [address] AT [facility] [address] [facility]  [address] [address]  Phone: [phone] Fax: [phone]      Local or Mail Order: Local   Ordering Provider:Tk rider  Would the patient rather a call back or a response [facility]? Call back   Best Call Back Number:[phone]  Additional Information:

## 2022-01-05 NOTE — TELEPHONE ENCOUNTER
Pt last seen 09/02/20  Pt is requesting refill on NuvaRing  Pharmacy up to date  Medication pending

## 2022-01-19 ENCOUNTER — OFFICE VISIT (OUTPATIENT)
Dept: OBSTETRICS AND GYNECOLOGY | Facility: CLINIC | Age: 52
End: 2022-01-19
Payer: COMMERCIAL

## 2022-01-19 VITALS
HEIGHT: 66 IN | DIASTOLIC BLOOD PRESSURE: 72 MMHG | WEIGHT: 159.38 LBS | BODY MASS INDEX: 25.61 KG/M2 | SYSTOLIC BLOOD PRESSURE: 110 MMHG

## 2022-01-19 DIAGNOSIS — Z12.31 ENCOUNTER FOR SCREENING MAMMOGRAM FOR BREAST CANCER: ICD-10-CM

## 2022-01-19 DIAGNOSIS — Z01.419 ENCOUNTER FOR GYNECOLOGICAL EXAMINATION WITHOUT ABNORMAL FINDING: Primary | ICD-10-CM

## 2022-01-19 DIAGNOSIS — Z30.09 ENCOUNTER FOR OTHER GENERAL COUNSELING OR ADVICE ON CONTRACEPTION: ICD-10-CM

## 2022-01-19 DIAGNOSIS — Z12.4 PAP SMEAR FOR CERVICAL CANCER SCREENING: ICD-10-CM

## 2022-01-19 PROCEDURE — 99999 PR PBB SHADOW E&M-EST. PATIENT-LVL III: CPT | Mod: PBBFAC,,, | Performed by: OBSTETRICS & GYNECOLOGY

## 2022-01-19 PROCEDURE — 99396 PREV VISIT EST AGE 40-64: CPT | Mod: S$GLB,,, | Performed by: OBSTETRICS & GYNECOLOGY

## 2022-01-19 PROCEDURE — 87624 HPV HI-RISK TYP POOLED RSLT: CPT | Performed by: OBSTETRICS & GYNECOLOGY

## 2022-01-19 PROCEDURE — 3078F PR MOST RECENT DIASTOLIC BLOOD PRESSURE < 80 MM HG: ICD-10-PCS | Mod: CPTII,S$GLB,, | Performed by: OBSTETRICS & GYNECOLOGY

## 2022-01-19 PROCEDURE — 88175 CYTOPATH C/V AUTO FLUID REDO: CPT | Performed by: OBSTETRICS & GYNECOLOGY

## 2022-01-19 PROCEDURE — 99396 PR PREVENTIVE VISIT,EST,40-64: ICD-10-PCS | Mod: S$GLB,,, | Performed by: OBSTETRICS & GYNECOLOGY

## 2022-01-19 PROCEDURE — 87624 HPV HI-RISK TYP POOLED RSLT: CPT | Mod: 91 | Performed by: OBSTETRICS & GYNECOLOGY

## 2022-01-19 PROCEDURE — 3074F PR MOST RECENT SYSTOLIC BLOOD PRESSURE < 130 MM HG: ICD-10-PCS | Mod: CPTII,S$GLB,, | Performed by: OBSTETRICS & GYNECOLOGY

## 2022-01-19 PROCEDURE — 3078F DIAST BP <80 MM HG: CPT | Mod: CPTII,S$GLB,, | Performed by: OBSTETRICS & GYNECOLOGY

## 2022-01-19 PROCEDURE — 1159F MED LIST DOCD IN RCRD: CPT | Mod: CPTII,S$GLB,, | Performed by: OBSTETRICS & GYNECOLOGY

## 2022-01-19 PROCEDURE — 3008F BODY MASS INDEX DOCD: CPT | Mod: CPTII,S$GLB,, | Performed by: OBSTETRICS & GYNECOLOGY

## 2022-01-19 PROCEDURE — 3074F SYST BP LT 130 MM HG: CPT | Mod: CPTII,S$GLB,, | Performed by: OBSTETRICS & GYNECOLOGY

## 2022-01-19 PROCEDURE — 99999 PR PBB SHADOW E&M-EST. PATIENT-LVL III: ICD-10-PCS | Mod: PBBFAC,,, | Performed by: OBSTETRICS & GYNECOLOGY

## 2022-01-19 PROCEDURE — 3008F PR BODY MASS INDEX (BMI) DOCUMENTED: ICD-10-PCS | Mod: CPTII,S$GLB,, | Performed by: OBSTETRICS & GYNECOLOGY

## 2022-01-19 PROCEDURE — 1159F PR MEDICATION LIST DOCUMENTED IN MEDICAL RECORD: ICD-10-PCS | Mod: CPTII,S$GLB,, | Performed by: OBSTETRICS & GYNECOLOGY

## 2022-01-19 RX ORDER — ETONOGESTREL AND ETHINYL ESTRADIOL VAGINAL RING .015; .12 MG/D; MG/D
RING VAGINAL
Qty: 1 EACH | Refills: 11 | Status: SHIPPED | OUTPATIENT
Start: 2022-01-19 | End: 2022-06-04 | Stop reason: SDUPTHER

## 2022-01-19 NOTE — PROGRESS NOTES
"  Subjective:       Patient ID: Anita Brock is a 51 y.o. female.    Chief Complaint:  Annual Exam      History of Present Illness  HPI  The patient presents for exam with no complaints, regular menses, no gyn issues  Contraceptive measures are addressed. On Nuvaring to control menses ..    Recommend FSH level on day 7 of withdraw in one year   Preventive testing reviewed and discussed.  TC score 23 % 2 years ago.   Reviewed high risk breast clinic recommendations.  MRI not done, still on one year mammogram.  Discussed with patient, will alter after calculation of score with this screening   Pap every 3 years       Health Maintenance   Topic Date Due    Hepatitis C Screening  Never done    Lipid Panel  Never done    TETANUS VACCINE  Never done    Mammogram  10/27/2021     GYN & OB History  Patient's last menstrual period was 2022.   Date of Last Pap: No result found    OB History    Para Term  AB Living   2 2 2     2   SAB IAB Ectopic Multiple Live Births           2      # Outcome Date GA Lbr Oscar/2nd Weight Sex Delivery Anes PTL Lv   2 Term 04 40w0d  3.912 kg (8 lb 10 oz) M Vag-Spont EPI N DEBORA   1 Term 11/15/98 40w0d  3.997 kg (8 lb 13 oz) M Vag-Spont EPI N DEBORA       Review of Systems  Review of Systems        Objective:   /72   Ht 5' 6" (1.676 m)   Wt 72.3 kg (159 lb 6.3 oz)   LMP 2022   BMI 25.73 kg/m²    Physical Exam:   Constitutional: She appears well-developed and well-nourished. No distress.      Neck: No JVD present. No thyroid mass and no thyromegaly present.    Cardiovascular: Normal rate and regular rhythm.     Pulmonary/Chest: Right breast exhibits no inverted nipple, no mass, no nipple discharge, no skin change, no tenderness, no bleeding and no swelling. Left breast exhibits no inverted nipple, no mass, no nipple discharge, no skin change, no tenderness, no bleeding and no swelling.        Abdominal: Soft. Normal appearance and bowel sounds are " normal. There is no hepatosplenomegaly. No hernia. Hernia confirmed negative in the ventral area, confirmed negative in the right inguinal area and confirmed negative in the left inguinal area.     Genitourinary:    Vagina, uterus and rectum normal.   Labial bartholins normal.There is no rash, tenderness, lesion or injury on the right labia. There is no rash, tenderness, lesion or injury on the left labia. Cervix is normal. Right adnexum displays no mass, no tenderness and no fullness. Left adnexum displays no mass, no tenderness and no fullness. No erythema,  no vaginal discharge, tenderness or bleeding in the vagina.    No foreign body in the vagina.   Cervix exhibits no motion tenderness, no discharge and no friability. Additional cervical findings: pap smear abnormal Uterus is not deviated, not enlarged, not fixed, not tender and not experiencing uterine prolapse.                      Assessment:        1. Encounter for gynecological examination without abnormal finding    2. Encounter for screening mammogram for breast cancer    3. Pap smear for cervical cancer screening    4. Encounter for other general counseling or advice on contraception                Plan:            Anita was seen today for annual exam.    Diagnoses and all orders for this visit:    Encounter for gynecological examination without abnormal finding    Encounter for screening mammogram for breast cancer  -     Mammo Digital Screening Bilat w/ Ridge; Future    Pap smear for cervical cancer screening  -     Liquid-Based Pap Smear, Screening  -     HPV High Risk Genotypes, PCR    Encounter for other general counseling or advice on contraception  -     etonogestreL-ethinyl estradioL (NUVARING) 0.12-0.015 mg/24 hr vaginal ring; Place one device vaginally, leave in for 25 days, remove for 5 then insert new device

## 2022-01-28 LAB
CLINICAL INFO: NORMAL
CYTO CVX: NORMAL
CYTOLOGIST CVX/VAG CYTO: NORMAL
CYTOLOGIST CVX/VAG CYTO: NORMAL
CYTOLOGY CMNT CVX/VAG CYTO-IMP: NORMAL
CYTOLOGY PAP THIN PREP EXPLANATION: NORMAL
DATE OF PREVIOUS PAP: NORMAL
DATE PREVIOUS BX: NO
GEN CATEG CVX/VAG CYTO-IMP: NORMAL
HPV E6+E7 MRNA CVX QL NAA+PROBE: NOT DETECTED
HPV I/H RISK 4 DNA CVX QL NAA+PROBE: NORMAL
LMP START DATE: NORMAL
MICROORGANISM CVX/VAG CYTO: NORMAL
PATHOLOGIST CVX/VAG CYTO: NORMAL
SERVICE CMNT-IMP: NORMAL
SPECIMEN SOURCE CVX/VAG CYTO: NORMAL
STAT OF ADQ CVX/VAG CYTO-IMP: NORMAL

## 2022-02-09 ENCOUNTER — HOSPITAL ENCOUNTER (OUTPATIENT)
Dept: RADIOLOGY | Facility: HOSPITAL | Age: 52
Discharge: HOME | End: 2022-02-09
Attending: OBSTETRICS & GYNECOLOGY
Payer: COMMERCIAL

## 2022-02-09 DIAGNOSIS — Z12.31 ENCOUNTER FOR SCREENING MAMMOGRAM FOR BREAST CANCER: ICD-10-CM

## 2022-02-09 PROCEDURE — 77063 MAMMO DIGITAL SCREENING BILAT WITH TOMO: ICD-10-PCS | Mod: 26,,, | Performed by: RADIOLOGY

## 2022-02-09 PROCEDURE — 77067 SCR MAMMO BI INCL CAD: CPT | Mod: TC

## 2022-02-09 PROCEDURE — 77067 SCR MAMMO BI INCL CAD: CPT | Mod: 26,,, | Performed by: RADIOLOGY

## 2022-02-09 PROCEDURE — 77067 MAMMO DIGITAL SCREENING BILAT WITH TOMO: ICD-10-PCS | Mod: 26,,, | Performed by: RADIOLOGY

## 2022-02-09 PROCEDURE — 77063 BREAST TOMOSYNTHESIS BI: CPT | Mod: 26,,, | Performed by: RADIOLOGY

## 2022-02-16 ENCOUNTER — HOSPITAL ENCOUNTER (OUTPATIENT)
Dept: RADIOLOGY | Facility: HOSPITAL | Age: 52
Discharge: HOME | End: 2022-02-16
Attending: OBSTETRICS & GYNECOLOGY
Payer: COMMERCIAL

## 2022-02-16 DIAGNOSIS — R92.8 ABNORMAL MAMMOGRAM: ICD-10-CM

## 2022-02-16 PROCEDURE — 76642 US BREAST LEFT LIMITED: ICD-10-PCS | Mod: 26,LT,, | Performed by: RADIOLOGY

## 2022-02-16 PROCEDURE — 77061 BREAST TOMOSYNTHESIS UNI: CPT | Mod: 26,LT,, | Performed by: RADIOLOGY

## 2022-02-16 PROCEDURE — 76642 ULTRASOUND BREAST LIMITED: CPT | Mod: TC,LT

## 2022-02-16 PROCEDURE — 77065 DX MAMMO INCL CAD UNI: CPT | Mod: 26,LT,, | Performed by: RADIOLOGY

## 2022-02-16 PROCEDURE — 77065 MAMMO DIGITAL DIAGNOSTIC LEFT WITH TOMO: ICD-10-PCS | Mod: 26,LT,, | Performed by: RADIOLOGY

## 2022-02-16 PROCEDURE — 76642 ULTRASOUND BREAST LIMITED: CPT | Mod: 26,LT,, | Performed by: RADIOLOGY

## 2022-02-16 PROCEDURE — 77065 DX MAMMO INCL CAD UNI: CPT | Mod: TC,LT

## 2022-02-16 PROCEDURE — 77061 MAMMO DIGITAL DIAGNOSTIC LEFT WITH TOMO: ICD-10-PCS | Mod: 26,LT,, | Performed by: RADIOLOGY

## 2022-08-18 ENCOUNTER — HOSPITAL ENCOUNTER (OUTPATIENT)
Dept: RADIOLOGY | Facility: HOSPITAL | Age: 52
Discharge: HOME | End: 2022-08-18
Attending: OBSTETRICS & GYNECOLOGY
Payer: COMMERCIAL

## 2022-08-18 DIAGNOSIS — R92.8 ABNORMAL MAMMOGRAM: ICD-10-CM

## 2022-08-18 PROCEDURE — 77065 MAMMO DIGITAL DIAGNOSTIC LEFT WITH TOMO: ICD-10-PCS | Mod: 26,LT,, | Performed by: RADIOLOGY

## 2022-08-18 PROCEDURE — 76642 ULTRASOUND BREAST LIMITED: CPT | Mod: TC,LT

## 2022-08-18 PROCEDURE — 77061 MAMMO DIGITAL DIAGNOSTIC LEFT WITH TOMO: ICD-10-PCS | Mod: 26,LT,, | Performed by: RADIOLOGY

## 2022-08-18 PROCEDURE — 77065 DX MAMMO INCL CAD UNI: CPT | Mod: TC,LT

## 2022-08-18 PROCEDURE — 77065 DX MAMMO INCL CAD UNI: CPT | Mod: 26,LT,, | Performed by: RADIOLOGY

## 2022-08-18 PROCEDURE — 76642 US BREAST LEFT LIMITED: ICD-10-PCS | Mod: 26,LT,, | Performed by: RADIOLOGY

## 2022-08-18 PROCEDURE — 76642 ULTRASOUND BREAST LIMITED: CPT | Mod: 26,LT,, | Performed by: RADIOLOGY

## 2022-08-18 PROCEDURE — 77061 BREAST TOMOSYNTHESIS UNI: CPT | Mod: 26,LT,, | Performed by: RADIOLOGY

## 2023-01-25 ENCOUNTER — LAB VISIT (OUTPATIENT)
Dept: LAB | Facility: HOSPITAL | Age: 53
End: 2023-01-25
Attending: OBSTETRICS & GYNECOLOGY
Payer: COMMERCIAL

## 2023-01-25 ENCOUNTER — OFFICE VISIT (OUTPATIENT)
Dept: OBSTETRICS AND GYNECOLOGY | Facility: CLINIC | Age: 53
End: 2023-01-25
Payer: COMMERCIAL

## 2023-01-25 VITALS
BODY MASS INDEX: 25.79 KG/M2 | WEIGHT: 160.5 LBS | HEIGHT: 66 IN | SYSTOLIC BLOOD PRESSURE: 100 MMHG | DIASTOLIC BLOOD PRESSURE: 70 MMHG

## 2023-01-25 DIAGNOSIS — Z01.419 ENCNTR FOR GYN EXAM (GENERAL) (ROUTINE) W/O ABN FINDINGS: Primary | ICD-10-CM

## 2023-01-25 DIAGNOSIS — N95.1 PERIMENOPAUSAL: ICD-10-CM

## 2023-01-25 DIAGNOSIS — Z12.31 ENCOUNTER FOR SCREENING MAMMOGRAM FOR MALIGNANT NEOPLASM OF BREAST: ICD-10-CM

## 2023-01-25 PROCEDURE — 99396 PREV VISIT EST AGE 40-64: CPT | Mod: S$GLB,,, | Performed by: OBSTETRICS & GYNECOLOGY

## 2023-01-25 PROCEDURE — 3008F PR BODY MASS INDEX (BMI) DOCUMENTED: ICD-10-PCS | Mod: CPTII,S$GLB,, | Performed by: OBSTETRICS & GYNECOLOGY

## 2023-01-25 PROCEDURE — 99396 PR PREVENTIVE VISIT,EST,40-64: ICD-10-PCS | Mod: S$GLB,,, | Performed by: OBSTETRICS & GYNECOLOGY

## 2023-01-25 PROCEDURE — 3078F PR MOST RECENT DIASTOLIC BLOOD PRESSURE < 80 MM HG: ICD-10-PCS | Mod: CPTII,S$GLB,, | Performed by: OBSTETRICS & GYNECOLOGY

## 2023-01-25 PROCEDURE — 3074F PR MOST RECENT SYSTOLIC BLOOD PRESSURE < 130 MM HG: ICD-10-PCS | Mod: CPTII,S$GLB,, | Performed by: OBSTETRICS & GYNECOLOGY

## 2023-01-25 PROCEDURE — 99999 PR PBB SHADOW E&M-EST. PATIENT-LVL III: CPT | Mod: PBBFAC,,, | Performed by: OBSTETRICS & GYNECOLOGY

## 2023-01-25 PROCEDURE — 83001 ASSAY OF GONADOTROPIN (FSH): CPT | Performed by: OBSTETRICS & GYNECOLOGY

## 2023-01-25 PROCEDURE — 3074F SYST BP LT 130 MM HG: CPT | Mod: CPTII,S$GLB,, | Performed by: OBSTETRICS & GYNECOLOGY

## 2023-01-25 PROCEDURE — 3078F DIAST BP <80 MM HG: CPT | Mod: CPTII,S$GLB,, | Performed by: OBSTETRICS & GYNECOLOGY

## 2023-01-25 PROCEDURE — 99999 PR PBB SHADOW E&M-EST. PATIENT-LVL III: ICD-10-PCS | Mod: PBBFAC,,, | Performed by: OBSTETRICS & GYNECOLOGY

## 2023-01-25 PROCEDURE — 1159F MED LIST DOCD IN RCRD: CPT | Mod: CPTII,S$GLB,, | Performed by: OBSTETRICS & GYNECOLOGY

## 2023-01-25 PROCEDURE — 36415 COLL VENOUS BLD VENIPUNCTURE: CPT | Performed by: OBSTETRICS & GYNECOLOGY

## 2023-01-25 PROCEDURE — 3008F BODY MASS INDEX DOCD: CPT | Mod: CPTII,S$GLB,, | Performed by: OBSTETRICS & GYNECOLOGY

## 2023-01-25 PROCEDURE — 1159F PR MEDICATION LIST DOCUMENTED IN MEDICAL RECORD: ICD-10-PCS | Mod: CPTII,S$GLB,, | Performed by: OBSTETRICS & GYNECOLOGY

## 2023-01-25 NOTE — PROGRESS NOTES
"  Subjective:       Patient ID: Anita Brock is a 52 y.o. female.    Chief Complaint:  No chief complaint on file.      History of Present Illness  HPI  The patient presents for exam with no complaints, regular menses on Nuvaring , no gyn issues  Contraceptive measures are addressed.   Check FSH to consider stopping Nuvaring   Preventive testing reviewed and discussed.    Health Maintenance   Topic Date Due    Hepatitis C Screening  Never done    Lipid Panel  Never done    TETANUS VACCINE  Never done    Mammogram  2023     GYN & OB History  Patient's last menstrual period was 2023.   Date of Last Pap: No result found    OB History    Para Term  AB Living   2 2 2     2   SAB IAB Ectopic Multiple Live Births           2      # Outcome Date GA Lbr Oscar/2nd Weight Sex Delivery Anes PTL Lv   2 Term 04 40w0d  3.912 kg (8 lb 10 oz) M Vag-Spont EPI N DEBORA   1 Term 11/15/98 40w0d  3.997 kg (8 lb 13 oz) M Vag-Spont EPI N DEBORA       Review of Systems  Review of Systems        Objective:   /70   Ht 5' 6" (1.676 m)   Wt 72.8 kg (160 lb 7.9 oz)   LMP 2023   BMI 25.90 kg/m²    Physical Exam:   Constitutional: She appears well-developed and well-nourished. No distress.      Neck: No JVD present. No thyroid mass and no thyromegaly present.    Cardiovascular:  Normal rate and regular rhythm.                  Abdominal: Soft. Bowel sounds are normal. No hernia. Hernia confirmed negative in the ventral area, confirmed negative in the right inguinal area and confirmed negative in the left inguinal area.     Genitourinary:    Vagina, uterus and rectum normal.   The external female genitalia was normal.   Labial bartholins normal.There is no rash, tenderness, lesion or injury on the right labia. There is no rash, tenderness, lesion or injury on the left labia. Cervix is normal. Right adnexum displays no mass, no tenderness and no fullness. Left adnexum displays no mass, no tenderness and " no fullness. No erythema,  no vaginal discharge, tenderness or bleeding in the vagina.    No foreign body in the vagina.   Cervix exhibits no motion tenderness, no discharge and no friability. Uterus is not deviated, not enlarged, not fixed and not tender. Normal urethral meatus.Urethra findings: no tendernessBladder findings: no bladder tenderness                   Assessment:        1. Encntr for gyn exam (general) (routine) w/o abn findings    2. Encounter for screening mammogram for malignant neoplasm of breast    3. Perimenopausal                Plan:            Diagnoses and all orders for this visit:    Encntr for gyn exam (general) (routine) w/o abn findings    Encounter for screening mammogram for malignant neoplasm of breast  -     Mammo Digital Screening Bilat w/ Ridge; Future    Perimenopausal  -     Follicle Stimulating Hormone; Future

## 2023-01-27 LAB — FSH SERPL-ACNC: 86.85 MIU/ML

## 2023-02-02 ENCOUNTER — OFFICE VISIT (OUTPATIENT)
Dept: INTERNAL MEDICINE | Facility: CLINIC | Age: 53
End: 2023-02-02
Payer: COMMERCIAL

## 2023-02-02 ENCOUNTER — LAB VISIT (OUTPATIENT)
Dept: LAB | Facility: HOSPITAL | Age: 53
End: 2023-02-02
Attending: FAMILY MEDICINE
Payer: COMMERCIAL

## 2023-02-02 VITALS
TEMPERATURE: 98 F | RESPIRATION RATE: 18 BRPM | HEIGHT: 66 IN | OXYGEN SATURATION: 98 % | WEIGHT: 159.63 LBS | SYSTOLIC BLOOD PRESSURE: 108 MMHG | HEART RATE: 74 BPM | DIASTOLIC BLOOD PRESSURE: 68 MMHG | BODY MASS INDEX: 25.66 KG/M2

## 2023-02-02 DIAGNOSIS — Z00.00 ANNUAL PHYSICAL EXAM: Primary | ICD-10-CM

## 2023-02-02 DIAGNOSIS — Z12.11 SCREEN FOR COLON CANCER: ICD-10-CM

## 2023-02-02 DIAGNOSIS — Z00.00 ANNUAL PHYSICAL EXAM: ICD-10-CM

## 2023-02-02 LAB
ALBUMIN SERPL BCP-MCNC: 4 G/DL (ref 3.5–5.2)
ALP SERPL-CCNC: 51 U/L (ref 55–135)
ALT SERPL W/O P-5'-P-CCNC: 16 U/L (ref 10–44)
ANION GAP SERPL CALC-SCNC: 11 MMOL/L (ref 8–16)
AST SERPL-CCNC: 22 U/L (ref 10–40)
BASOPHILS # BLD AUTO: 0.06 K/UL (ref 0–0.2)
BASOPHILS NFR BLD: 0.9 % (ref 0–1.9)
BILIRUB SERPL-MCNC: 0.5 MG/DL (ref 0.1–1)
BUN SERPL-MCNC: 12 MG/DL (ref 6–20)
CALCIUM SERPL-MCNC: 9.3 MG/DL (ref 8.7–10.5)
CHLORIDE SERPL-SCNC: 105 MMOL/L (ref 95–110)
CHOLEST SERPL-MCNC: 230 MG/DL (ref 120–199)
CHOLEST/HDLC SERPL: 2.7 {RATIO} (ref 2–5)
CO2 SERPL-SCNC: 26 MMOL/L (ref 23–29)
CREAT SERPL-MCNC: 0.8 MG/DL (ref 0.5–1.4)
DIFFERENTIAL METHOD: ABNORMAL
EOSINOPHIL # BLD AUTO: 0.3 K/UL (ref 0–0.5)
EOSINOPHIL NFR BLD: 3.8 % (ref 0–8)
ERYTHROCYTE [DISTWIDTH] IN BLOOD BY AUTOMATED COUNT: 12.7 % (ref 11.5–14.5)
EST. GFR  (NO RACE VARIABLE): >60 ML/MIN/1.73 M^2
GLUCOSE SERPL-MCNC: 83 MG/DL (ref 70–110)
HCT VFR BLD AUTO: 42 % (ref 37–48.5)
HCV AB SERPL QL IA: NORMAL
HDLC SERPL-MCNC: 86 MG/DL (ref 40–75)
HDLC SERPL: 37.4 % (ref 20–50)
HGB BLD-MCNC: 13 G/DL (ref 12–16)
IMM GRANULOCYTES # BLD AUTO: 0.01 K/UL (ref 0–0.04)
IMM GRANULOCYTES NFR BLD AUTO: 0.1 % (ref 0–0.5)
LDLC SERPL CALC-MCNC: 132.2 MG/DL (ref 63–159)
LYMPHOCYTES # BLD AUTO: 1.6 K/UL (ref 1–4.8)
LYMPHOCYTES NFR BLD: 23.2 % (ref 18–48)
MCH RBC QN AUTO: 29.3 PG (ref 27–31)
MCHC RBC AUTO-ENTMCNC: 31 G/DL (ref 32–36)
MCV RBC AUTO: 95 FL (ref 82–98)
MONOCYTES # BLD AUTO: 0.4 K/UL (ref 0.3–1)
MONOCYTES NFR BLD: 5.9 % (ref 4–15)
NEUTROPHILS # BLD AUTO: 4.5 K/UL (ref 1.8–7.7)
NEUTROPHILS NFR BLD: 66.1 % (ref 38–73)
NONHDLC SERPL-MCNC: 144 MG/DL
NRBC BLD-RTO: 0 /100 WBC
PLATELET # BLD AUTO: 199 K/UL (ref 150–450)
PMV BLD AUTO: 12.7 FL (ref 9.2–12.9)
POTASSIUM SERPL-SCNC: 4 MMOL/L (ref 3.5–5.1)
PROT SERPL-MCNC: 6.8 G/DL (ref 6–8.4)
RBC # BLD AUTO: 4.44 M/UL (ref 4–5.4)
SODIUM SERPL-SCNC: 142 MMOL/L (ref 136–145)
TRIGL SERPL-MCNC: 59 MG/DL (ref 30–150)
WBC # BLD AUTO: 6.77 K/UL (ref 3.9–12.7)

## 2023-02-02 PROCEDURE — 3078F PR MOST RECENT DIASTOLIC BLOOD PRESSURE < 80 MM HG: ICD-10-PCS | Mod: CPTII,S$GLB,, | Performed by: FAMILY MEDICINE

## 2023-02-02 PROCEDURE — 1159F MED LIST DOCD IN RCRD: CPT | Mod: CPTII,S$GLB,, | Performed by: FAMILY MEDICINE

## 2023-02-02 PROCEDURE — 80053 COMPREHEN METABOLIC PANEL: CPT | Performed by: FAMILY MEDICINE

## 2023-02-02 PROCEDURE — 99999 PR PBB SHADOW E&M-EST. PATIENT-LVL III: CPT | Mod: PBBFAC,,, | Performed by: FAMILY MEDICINE

## 2023-02-02 PROCEDURE — 3008F BODY MASS INDEX DOCD: CPT | Mod: CPTII,S$GLB,, | Performed by: FAMILY MEDICINE

## 2023-02-02 PROCEDURE — 85025 COMPLETE CBC W/AUTO DIFF WBC: CPT | Performed by: FAMILY MEDICINE

## 2023-02-02 PROCEDURE — 99999 PR PBB SHADOW E&M-EST. PATIENT-LVL III: ICD-10-PCS | Mod: PBBFAC,,, | Performed by: FAMILY MEDICINE

## 2023-02-02 PROCEDURE — 3008F PR BODY MASS INDEX (BMI) DOCUMENTED: ICD-10-PCS | Mod: CPTII,S$GLB,, | Performed by: FAMILY MEDICINE

## 2023-02-02 PROCEDURE — 86803 HEPATITIS C AB TEST: CPT | Performed by: FAMILY MEDICINE

## 2023-02-02 PROCEDURE — 3074F PR MOST RECENT SYSTOLIC BLOOD PRESSURE < 130 MM HG: ICD-10-PCS | Mod: CPTII,S$GLB,, | Performed by: FAMILY MEDICINE

## 2023-02-02 PROCEDURE — 99396 PREV VISIT EST AGE 40-64: CPT | Mod: S$GLB,,, | Performed by: FAMILY MEDICINE

## 2023-02-02 PROCEDURE — 80061 LIPID PANEL: CPT | Performed by: FAMILY MEDICINE

## 2023-02-02 PROCEDURE — 36415 COLL VENOUS BLD VENIPUNCTURE: CPT | Performed by: FAMILY MEDICINE

## 2023-02-02 PROCEDURE — 3074F SYST BP LT 130 MM HG: CPT | Mod: CPTII,S$GLB,, | Performed by: FAMILY MEDICINE

## 2023-02-02 PROCEDURE — 1159F PR MEDICATION LIST DOCUMENTED IN MEDICAL RECORD: ICD-10-PCS | Mod: CPTII,S$GLB,, | Performed by: FAMILY MEDICINE

## 2023-02-02 PROCEDURE — 99396 PR PREVENTIVE VISIT,EST,40-64: ICD-10-PCS | Mod: S$GLB,,, | Performed by: FAMILY MEDICINE

## 2023-02-02 PROCEDURE — 3078F DIAST BP <80 MM HG: CPT | Mod: CPTII,S$GLB,, | Performed by: FAMILY MEDICINE

## 2023-02-02 NOTE — PROGRESS NOTES
[facility]  02/02/2023  [id number]    Ly Chacon MD  Patient Care Team:  Ly Chacon MD as PCP - General (Family Medicine)  Ginger Huddleston LPN as Care Coordinator (Internal Medicine)          Visit Type: ANNUAL, tyrone pt    Chief Complaint:  No chief complaint on file.      History of Present Illness:  52 year old  Annual exam  No personal history of medical problems  Family history of breast cancer    Health Maintenance Due   Topic Date Due    Hepatitis C Screening  Never done    Lipid Panel  Never done    TETANUS VACCINE  Never done    Colorectal Cancer Screening  Never done    COVID-19 Vaccine (4 - Booster for Pfizer series) 12/08/2021    Shingles Vaccine (1 of 2) Never done    Influenza Vaccine (1) Never done     She is due for labs and Colon.  Discussed immunizations    Followed by [facility] department        History:  No past medical history on file.  Past Surgical History:   Procedure Laterality Date    Dislocated Left Patella       Family History   Problem Relation Age of Onset    Leukemia Son     Hypertension Mother     Breast cancer Mother     Thrombosis Mother         DVT in leg    Colon cancer Maternal Uncle     Ovarian cancer Neg Hx      Social History     Socioeconomic History    Marital status:    Tobacco Use    Smoking status: Never    Smokeless tobacco: Never   Substance and Sexual Activity    Alcohol use: Yes     Comment: socially    Drug use: No    Sexual activity: Yes     Partners: Male     Birth control/protection: Inserts     Patient Active Problem List   Diagnosis    Family history of breast cancer in mother     Review of patient's allergies indicates:  No Known Allergies    The following were reviewed at this visit: active problem list, medication list, allergies, family history, social history, and health maintenance.    Medications:  Current Outpatient Medications on File Prior to Visit   Medication Sig Dispense Refill    etonogestreL-ethinyl estradioL  (NUVARING) 0.12-0.015 mg/24 hr vaginal ring INSERT 1 RING VAGINALLY EVERY 28 DAYS (Patient not taking: Reported on 2/2/2023) 1 each 11     No current facility-administered medications on file prior to visit.       Medications have been reviewed and reconciled with patient at this visit.  Barriers to medications reviewed with patient.    Adverse reactions to current medications reviewed with patient..    Over the counter medications reviewed and reconciled with patient.    Exam:  Wt Readings from Last 3 Encounters:   02/02/23 72.4 kg (159 lb 9.8 oz)   01/25/23 72.8 kg (160 lb 7.9 oz)   01/19/22 72.3 kg (159 lb 6.3 oz)     Temp Readings from Last 3 Encounters:   02/02/23 97.7 °F (36.5 °C) (Tympanic)   10/20/20 97.1 °F (36.2 °C) (Oral)   11/09/16 97.8 °F (36.6 °C) (Tympanic)     BP Readings from Last 3 Encounters:   02/02/23 108/68   01/25/23 100/70   01/19/22 110/72     Pulse Readings from Last 3 Encounters:   02/02/23 74   10/20/20 76   11/09/16 65     Body mass index is 25.76 kg/m².      Review of Systems   Constitutional: Negative.  Negative for chills and fever.   HENT: Negative.  Negative for congestion, sinus pain and sore throat.    Eyes:  Negative for blurred vision and double vision.   Respiratory:  Negative for cough, sputum production, shortness of breath and wheezing.    Cardiovascular:  Negative for chest pain, palpitations and leg swelling.   Gastrointestinal:  Negative for abdominal pain, constipation, diarrhea, heartburn, nausea and vomiting.   Genitourinary: Negative.    Musculoskeletal: Negative.    Skin: Negative.  Negative for rash.   Neurological: Negative.    Endo/Heme/Allergies: Negative.  Negative for polydipsia. Does not bruise/bleed easily.   Psychiatric/Behavioral:  Negative for depression and substance abuse.    Physical Exam  Nursing note reviewed.   Pulmonary:      Effort: Pulmonary effort is normal. No respiratory distress.   Neurological:      Mental Status: She is alert and oriented to  person, place, and time.   Psychiatric:         Mood and Affect: Mood normal.         Behavior: Behavior normal.         Thought Content: Thought content normal.         Judgment: Judgment normal.       Laboratory Reviewed ({Yes)  Lab Results   Component Value Date    WBC 6.62 06/11/2004    HGB 9.7 (L) 06/11/2004    HCT 30.9 (L) 06/11/2004     06/11/2004       Diagnoses and all orders for this visit:    Annual physical exam  -     CBC Auto Differential; Future  -     Comprehensive Metabolic Panel; Future  -     Lipid Panel; Future  -     Hepatitis C Antibody; Future    Screen for colon cancer  -     Ambulatory referral/consult to Endo Procedure ; Future                Care Plan/Goals: Reviewed    Goals    None         Follow up: No follow-ups on file.    After visit summary was printed and given to patient upon discharge today.  Patient goals and care plan are included in After Visit Summary.

## 2023-02-07 ENCOUNTER — PATIENT MESSAGE (OUTPATIENT)
Dept: ADMINISTRATIVE | Facility: HOSPITAL | Age: 53
End: 2023-02-07
Payer: COMMERCIAL

## 2023-02-07 ENCOUNTER — PATIENT MESSAGE (OUTPATIENT)
Dept: INTERNAL MEDICINE | Facility: CLINIC | Age: 53
End: 2023-02-07
Payer: COMMERCIAL

## 2023-02-08 ENCOUNTER — HOSPITAL ENCOUNTER (OUTPATIENT)
Dept: PREADMISSION TESTING | Facility: HOSPITAL | Age: 53
Discharge: HOME | End: 2023-02-08
Attending: INTERNAL MEDICINE
Payer: COMMERCIAL

## 2023-02-08 DIAGNOSIS — Z12.11 SCREEN FOR COLON CANCER: Primary | ICD-10-CM

## 2023-02-08 RX ORDER — SODIUM, POTASSIUM,MAG SULFATES 17.5-3.13G
1 SOLUTION, RECONSTITUTED, ORAL ORAL DAILY
Qty: 1 KIT | Refills: 0 | Status: SHIPPED | OUTPATIENT
Start: 2023-02-08 | End: 2023-02-10

## 2023-03-09 ENCOUNTER — HOSPITAL ENCOUNTER (OUTPATIENT)
Dept: RADIOLOGY | Facility: HOSPITAL | Age: 53
Discharge: HOME | End: 2023-03-09
Attending: OBSTETRICS & GYNECOLOGY
Payer: COMMERCIAL

## 2023-03-09 DIAGNOSIS — R92.8 ABNORMAL MAMMOGRAM: ICD-10-CM

## 2023-03-09 PROCEDURE — 76642 ULTRASOUND BREAST LIMITED: CPT | Mod: 26,LT,, | Performed by: RADIOLOGY

## 2023-03-09 PROCEDURE — 77066 DX MAMMO INCL CAD BI: CPT | Mod: TC

## 2023-03-09 PROCEDURE — 76642 ULTRASOUND BREAST LIMITED: CPT | Mod: TC,LT

## 2023-03-09 PROCEDURE — 77062 MAMMO DIGITAL DIAGNOSTIC BILAT WITH TOMO: ICD-10-PCS | Mod: 26,,, | Performed by: RADIOLOGY

## 2023-03-09 PROCEDURE — 76642 US BREAST LEFT LIMITED: ICD-10-PCS | Mod: 26,LT,, | Performed by: RADIOLOGY

## 2023-03-09 PROCEDURE — 77062 BREAST TOMOSYNTHESIS BI: CPT | Mod: 26,,, | Performed by: RADIOLOGY

## 2023-03-09 PROCEDURE — 77066 MAMMO DIGITAL DIAGNOSTIC BILAT WITH TOMO: ICD-10-PCS | Mod: 26,,, | Performed by: RADIOLOGY

## 2023-03-09 PROCEDURE — 77066 DX MAMMO INCL CAD BI: CPT | Mod: 26,,, | Performed by: RADIOLOGY

## 2023-05-05 RX ORDER — SODIUM, POTASSIUM,MAG SULFATES 17.5-3.13G
1 SOLUTION, RECONSTITUTED, ORAL ORAL DAILY
Qty: 1 KIT | Refills: 0 | Status: SHIPPED | OUTPATIENT
Start: 2023-05-05 | End: 2023-05-07

## 2023-07-13 ENCOUNTER — OFFICE VISIT (OUTPATIENT)
Dept: OTOLARYNGOLOGY | Facility: CLINIC | Age: 53
End: 2023-07-13
Payer: COMMERCIAL

## 2023-07-13 VITALS — HEIGHT: 72 IN | BODY MASS INDEX: 21.26 KG/M2 | WEIGHT: 156.94 LBS | TEMPERATURE: 98 F

## 2023-07-13 DIAGNOSIS — H93.13 TINNITUS OF BOTH EARS: ICD-10-CM

## 2023-07-13 DIAGNOSIS — R42 DIZZINESS: Primary | ICD-10-CM

## 2023-07-13 PROCEDURE — 1159F PR MEDICATION LIST DOCUMENTED IN MEDICAL RECORD: ICD-10-PCS | Mod: CPTII,S$GLB,, | Performed by: PHYSICIAN ASSISTANT

## 2023-07-13 PROCEDURE — 3008F BODY MASS INDEX DOCD: CPT | Mod: CPTII,S$GLB,, | Performed by: PHYSICIAN ASSISTANT

## 2023-07-13 PROCEDURE — 99999 PR PBB SHADOW E&M-EST. PATIENT-LVL III: ICD-10-PCS | Mod: PBBFAC,,, | Performed by: PHYSICIAN ASSISTANT

## 2023-07-13 PROCEDURE — 99999 PR PBB SHADOW E&M-EST. PATIENT-LVL III: CPT | Mod: PBBFAC,,, | Performed by: PHYSICIAN ASSISTANT

## 2023-07-13 PROCEDURE — 99203 OFFICE O/P NEW LOW 30 MIN: CPT | Mod: S$GLB,,, | Performed by: PHYSICIAN ASSISTANT

## 2023-07-13 PROCEDURE — 99203 PR OFFICE/OUTPT VISIT, NEW, LEVL III, 30-44 MIN: ICD-10-PCS | Mod: S$GLB,,, | Performed by: PHYSICIAN ASSISTANT

## 2023-07-13 PROCEDURE — 3008F PR BODY MASS INDEX (BMI) DOCUMENTED: ICD-10-PCS | Mod: CPTII,S$GLB,, | Performed by: PHYSICIAN ASSISTANT

## 2023-07-13 PROCEDURE — 1159F MED LIST DOCD IN RCRD: CPT | Mod: CPTII,S$GLB,, | Performed by: PHYSICIAN ASSISTANT

## 2023-07-13 NOTE — PROGRESS NOTES
Subjective     Patient ID: Anita Brock is a 52 y.o. female.    Chief Complaint: Dizziness (Pt states started last year and lasted x2 weeks, Last episode in April-still unablt to turn on right side)    Patient is a very pleasant 52 y.o. female here to see me today for the first time for evaluation of dizziness.   She reports that the symptoms have been present for the last 2 months; first day lasted for less than 24 hours with nausea and is now only when/if she turns to her right side while in bed.  She describes the dizziness as whirling and spinning and says that it lasts minutes.  She has noted that turning to right side while lying in bed acts as a trigger.  She denies aural pressure, otalgia, otorrhea, and hearing loss.  She has had some tinnitus AD>AS recently.  She has not started any new medications, and has not had any recent dietary changes.  She reports initial episode of dizziness was about one year ago.  She reports it lasted about 24 hours and then gradually improved after 2 weeks.  She reports it was triggered by turning to right side at that time as well.  Denies any head trauma recently.           Review of Systems   Constitutional: Negative.  Negative for fever.   HENT:  Positive for tinnitus. Negative for ear discharge, ear pain, hearing loss, postnasal drip, rhinorrhea, sinus pressure/congestion and sneezing.    Eyes: Negative.    Respiratory: Negative.     Cardiovascular: Negative.    Gastrointestinal: Negative.    Endocrine: Negative.    Genitourinary: Negative.    Integumentary:  Negative.   Allergic/Immunologic: Negative.    Neurological:  Positive for dizziness and light-headedness.   Hematological: Negative.    Psychiatric/Behavioral: Negative.          Objective     Physical Exam  Constitutional:       General: She is not in acute distress.     Appearance: She is well-developed.   HENT:      Head: Normocephalic and atraumatic.      Right Ear: Tympanic membrane, ear canal and external  ear normal. No middle ear effusion. Tympanic membrane is not erythematous.      Left Ear: Tympanic membrane, ear canal and external ear normal.  No middle ear effusion. Tympanic membrane is not erythematous.      Nose: Nose normal. No nasal deformity, mucosal edema or rhinorrhea.      Right Sinus: No maxillary sinus tenderness or frontal sinus tenderness.      Left Sinus: No maxillary sinus tenderness or frontal sinus tenderness.      Mouth/Throat:      Mouth: Mucous membranes are moist. Mucous membranes are not pale and not dry.      Dentition: Normal dentition.      Pharynx: Uvula midline. No oropharyngeal exudate or posterior oropharyngeal erythema.   Eyes:      General: Lids are normal. No scleral icterus.     Extraocular Movements:      Right eye: Normal extraocular motion and no nystagmus.      Left eye: Normal extraocular motion and no nystagmus.      Conjunctiva/sclera: Conjunctivae normal.      Right eye: Right conjunctiva is not injected. No chemosis.     Left eye: Left conjunctiva is not injected. No chemosis.     Pupils: Pupils are equal, round, and reactive to light.   Neck:      Trachea: Trachea and phonation normal. No tracheal tenderness or tracheal deviation.   Pulmonary:      Effort: Pulmonary effort is normal. No respiratory distress.   Lymphadenopathy:      Head:      Right side of head: No submental, submandibular, preauricular or posterior auricular adenopathy.      Left side of head: No submental, submandibular, preauricular or posterior auricular adenopathy.      Cervical: No cervical adenopathy.   Skin:     General: Skin is warm and dry.      Findings: No erythema or rash.   Neurological:      Mental Status: She is alert and oriented to person, place, and time.      Cranial Nerves: No cranial nerve deficit.   Psychiatric:         Behavior: Behavior normal.            Assessment and Plan     1. Dizziness    2. Tinnitus of both ears        Discussed with patient that it sounds like she may  have BPPV.  We had a long discussion regarding the relevant anatomy and pathology relevant to BPPV.  We discussed that in the ear there are three semicircular canals that detect rotational movement.  BPPV occurs as a result of otoconia, tiny crystals of calcium carbonate that are a normal part of the inner ears anatomy, detaching from their normal anatomic position and collecting in one of the semicircular canals.  When the head moves, the otoconia shift. This stimulates the cupula to send false signals to the brain, producing vertigo and triggering nystagmus (involuntary eye movements).      She did not want to have Issa-Hallpike or Epley maneuver performed today because she's fearful of causing dizziness and she came alone today.  We will arrange for the patient to see me and our audiologist next week for Issa-Hallpike and possible CRM of the right ear, and she will give the patient necessary post-procedure instructions at that time.  Discussed that if testing negative for BPPV, she would then need to schedule audiogram and VNG for further evaluation.  She voiced understanding.    Tinnitus is most often caused by a hearing loss, and that as the hair cells are damaged, either genetic or as a result of loud noise exposure, they then cause tinnitus.  Some patients find that restricting the salt or caffeine in their diet helps, and there is also an OTC supplement, lipoflavinoids, that some people find to be effective though their benefit is not fully proven.  Tinnitus tends to be louder in times of stress and fatigue, and may decrease with time.  Sound machines may also be an effective masking technique if needed at night.         No follow-ups on file.

## 2023-07-14 ENCOUNTER — HOSPITAL ENCOUNTER (OUTPATIENT)
Dept: PREADMISSION TESTING | Facility: HOSPITAL | Age: 53
Discharge: HOME | End: 2023-07-14
Attending: INTERNAL MEDICINE
Payer: COMMERCIAL

## 2023-07-20 ENCOUNTER — CLINICAL SUPPORT (OUTPATIENT)
Dept: AUDIOLOGY | Facility: CLINIC | Age: 53
End: 2023-07-20
Payer: COMMERCIAL

## 2023-07-20 ENCOUNTER — OFFICE VISIT (OUTPATIENT)
Dept: OTOLARYNGOLOGY | Facility: CLINIC | Age: 53
End: 2023-07-20
Payer: COMMERCIAL

## 2023-07-20 DIAGNOSIS — R42 DIZZINESS: Primary | ICD-10-CM

## 2023-07-20 DIAGNOSIS — H93.13 TINNITUS OF BOTH EARS: ICD-10-CM

## 2023-07-20 PROCEDURE — 99999 PR PBB SHADOW E&M-EST. PATIENT-LVL I: CPT | Mod: PBBFAC,,, | Performed by: AUDIOLOGIST-HEARING AID FITTER

## 2023-07-20 PROCEDURE — 1159F MED LIST DOCD IN RCRD: CPT | Mod: CPTII,S$GLB,, | Performed by: PHYSICIAN ASSISTANT

## 2023-07-20 PROCEDURE — 99999 PR PBB SHADOW E&M-EST. PATIENT-LVL II: ICD-10-PCS | Mod: PBBFAC,,, | Performed by: PHYSICIAN ASSISTANT

## 2023-07-20 PROCEDURE — 99212 OFFICE O/P EST SF 10 MIN: CPT | Mod: S$GLB,,, | Performed by: PHYSICIAN ASSISTANT

## 2023-07-20 PROCEDURE — 99999 PR PBB SHADOW E&M-EST. PATIENT-LVL II: CPT | Mod: PBBFAC,,, | Performed by: PHYSICIAN ASSISTANT

## 2023-07-20 PROCEDURE — 99212 PR OFFICE/OUTPT VISIT, EST, LEVL II, 10-19 MIN: ICD-10-PCS | Mod: S$GLB,,, | Performed by: PHYSICIAN ASSISTANT

## 2023-07-20 PROCEDURE — 99999 PR PBB SHADOW E&M-EST. PATIENT-LVL I: ICD-10-PCS | Mod: PBBFAC,,, | Performed by: AUDIOLOGIST-HEARING AID FITTER

## 2023-07-20 PROCEDURE — 1159F PR MEDICATION LIST DOCUMENTED IN MEDICAL RECORD: ICD-10-PCS | Mod: CPTII,S$GLB,, | Performed by: PHYSICIAN ASSISTANT

## 2023-07-20 NOTE — PROGRESS NOTES
Subjective     Patient ID: Anita Brock is a 52 y.o. female.    Chief Complaint: Dizziness (vertigo)    Patient is a very pleasant 52 y.o. female here to see me today for [place]-Hallpike and possible Epley maneuever.  She was first here with me on 7/13/23 with dizziness but came alone and did not want testing for BPPV at that visit.  She is back today with her spouse.  She has noted improvement in her dizziness since the last visit.  She has not noticed any spinning and even turned head to right while in bed this week and stayed asymptomatic.  Reports that the tinnitus AD is less noticeable too.    Reported dizziness x 2 months; first day lasted for less than 24 hours with nausea.  Then only when/if she turns to her right side while in bed.  She describes the dizziness as whirling and spinning and says that it lasts minutes.  She has noted that turning to right side while lying in bed acts as a trigger.    She denies aural pressure, otalgia, otorrhea, and hearing loss.  She has had some tinnitus AD>AS recently.    She has not started any new medications, and has not had any recent dietary changes.    She reports initial episode of dizziness was about one year ago.  She reports it lasted about 24 hours and then gradually improved after 2 weeks.            Review of Systems   HENT:  Positive for tinnitus (AD>AS). Negative for ear discharge, ear pain and hearing loss.    Neurological:  Positive for dizziness (improved).        Objective     Physical Exam  Constitutional:       Appearance: Normal appearance.   HENT:      Head: Normocephalic and atraumatic.      Right Ear: External ear normal.      Left Ear: External ear normal.   Pulmonary:      Effort: Pulmonary effort is normal.   Neurological:      General: No focal deficit present.      Mental Status: She is alert.       Issa-Hallpike:  Negative AU           Assessment and Plan     1. Dizziness    2. Tinnitus of both ears        Testing today negative for BPPV.   Recommend scheduling audiogram/VNG if any recurrence of her dizziness.  Discussed that if tinnitus persists or worsens (even without dizziness), I would recommend audiogram for further evaluation.  We also discussed that tinnitus is most often caused by a hearing loss, and that as the hair cells are damaged, either genetic or as a result of loud noise exposure, they then cause tinnitus.  Some patients find that restricting the salt or caffeine in their diet helps, and there is also an OTC supplement, lipoflavinoids, that some people find to be effective though their benefit is not fully proven.  Tinnitus tends to be louder in times of stress and fatigue, and may decrease with time.  Sound machines may also be an effective masking technique if needed at night.           No follow-ups on file.

## 2023-07-20 NOTE — PROGRESS NOTES
Anita Brock was seen 07/20/2023 for BPPV testing.  Assisted ENT, Nataly Hoffman PA-C, with testing.  Patient is asymptomatic at time of testing.     Issa-Hallpike Head-hanging Right: Negative. No nystagmus or dizziness were elicited.   Issa-Hallpike Head-hanging Left: Negative. No nystagmus or dizziness were elicited.     Rec: ENT - Recommend scheduling audiogram/VNG/EcoG if any recurrence of her dizziness.

## 2023-08-14 ENCOUNTER — OFFICE VISIT (OUTPATIENT)
Dept: INTERNAL MEDICINE | Facility: CLINIC | Age: 53
End: 2023-08-14
Payer: COMMERCIAL

## 2023-08-14 VITALS
SYSTOLIC BLOOD PRESSURE: 106 MMHG | BODY MASS INDEX: 25.12 KG/M2 | HEART RATE: 68 BPM | HEIGHT: 66 IN | WEIGHT: 156.31 LBS | TEMPERATURE: 97 F | OXYGEN SATURATION: 98 % | DIASTOLIC BLOOD PRESSURE: 64 MMHG

## 2023-08-14 DIAGNOSIS — M54.50 LUMBAR SPINE PAIN: Primary | ICD-10-CM

## 2023-08-14 PROCEDURE — 99999 PR PBB SHADOW E&M-EST. PATIENT-LVL III: CPT | Mod: PBBFAC,,, | Performed by: FAMILY MEDICINE

## 2023-08-14 PROCEDURE — 3078F PR MOST RECENT DIASTOLIC BLOOD PRESSURE < 80 MM HG: ICD-10-PCS | Mod: CPTII,S$GLB,, | Performed by: FAMILY MEDICINE

## 2023-08-14 PROCEDURE — 99213 PR OFFICE/OUTPT VISIT, EST, LEVL III, 20-29 MIN: ICD-10-PCS | Mod: S$GLB,,, | Performed by: FAMILY MEDICINE

## 2023-08-14 PROCEDURE — 3008F PR BODY MASS INDEX (BMI) DOCUMENTED: ICD-10-PCS | Mod: CPTII,S$GLB,, | Performed by: FAMILY MEDICINE

## 2023-08-14 PROCEDURE — 99999 PR PBB SHADOW E&M-EST. PATIENT-LVL III: ICD-10-PCS | Mod: PBBFAC,,, | Performed by: FAMILY MEDICINE

## 2023-08-14 PROCEDURE — 1159F MED LIST DOCD IN RCRD: CPT | Mod: CPTII,S$GLB,, | Performed by: FAMILY MEDICINE

## 2023-08-14 PROCEDURE — 3008F BODY MASS INDEX DOCD: CPT | Mod: CPTII,S$GLB,, | Performed by: FAMILY MEDICINE

## 2023-08-14 PROCEDURE — 99213 OFFICE O/P EST LOW 20 MIN: CPT | Mod: S$GLB,,, | Performed by: FAMILY MEDICINE

## 2023-08-14 PROCEDURE — 3078F DIAST BP <80 MM HG: CPT | Mod: CPTII,S$GLB,, | Performed by: FAMILY MEDICINE

## 2023-08-14 PROCEDURE — 3074F PR MOST RECENT SYSTOLIC BLOOD PRESSURE < 130 MM HG: ICD-10-PCS | Mod: CPTII,S$GLB,, | Performed by: FAMILY MEDICINE

## 2023-08-14 PROCEDURE — 3074F SYST BP LT 130 MM HG: CPT | Mod: CPTII,S$GLB,, | Performed by: FAMILY MEDICINE

## 2023-08-14 PROCEDURE — 1159F PR MEDICATION LIST DOCUMENTED IN MEDICAL RECORD: ICD-10-PCS | Mod: CPTII,S$GLB,, | Performed by: FAMILY MEDICINE

## 2023-08-14 NOTE — PROGRESS NOTES
Anita Dykes [facility]  08/14/2023  [id number]    Sonya Mccall MD  Patient Care Team:  Sonya Mccall MD as PCP - General (Family Medicine)  Ginger Huddleston LPN as Care Coordinator (Internal Medicine)          Visit Type:a scheduled routine follow-up visit    Chief Complaint:  Chief Complaint   Patient presents with    Back Pain     Happened a few months ago.        History of Present Illness:  52 year old   Lower back pain  She had a slip but no fall, left pull sensation.  This was 2 months ago.  This has not resolved.  She reports more bothersome.  Fine when standing and walking.  Hurts more with driving, and recumbent is worse.   Does not radiate down the legs  Doesn't help with Tylenol or Ibuprofen.      History:  History reviewed. No pertinent past medical history.  Past Surgical History:   Procedure Laterality Date    Dislocated Left Patella       Family History   Problem Relation Age of Onset    Leukemia Son     Hypertension Mother     Breast cancer Mother     Thrombosis Mother         DVT in leg    Colon cancer Maternal Uncle     Ovarian cancer Neg Hx      Social History     Socioeconomic History    Marital status:    Tobacco Use    Smoking status: Never    Smokeless tobacco: Never   Substance and Sexual Activity    Alcohol use: Yes     Comment: socially    Drug use: No    Sexual activity: Yes     Partners: Male     Birth control/protection: Inserts     Patient Active Problem List   Diagnosis    Family history of breast cancer in mother     Review of patient's allergies indicates:  No Known Allergies    The following were reviewed at this visit: active problem list, medication list, allergies, family history, social history, and health maintenance.    Medications:  No current outpatient medications on file prior to visit.     No current facility-administered medications on file prior to visit.       Medications have been reviewed and reconciled with patient at this visit.  Barriers to  medications reviewed with patient.    Adverse reactions to current medications reviewed with patient..    Over the counter medications reviewed and reconciled with patient.    Exam:  Wt Readings from Last 3 Encounters:   08/14/23 70.9 kg (156 lb 4.9 oz)   07/13/23 71.2 kg (156 lb 15.5 oz)   02/02/23 72.4 kg (159 lb 9.8 oz)     Temp Readings from Last 3 Encounters:   08/14/23 96.5 °F (35.8 °C) (Tympanic)   07/13/23 98 °F (36.7 °C) (Temporal)   02/02/23 97.7 °F (36.5 °C) (Tympanic)     BP Readings from Last 3 Encounters:   08/14/23 106/64   02/02/23 108/68   01/25/23 100/70     Pulse Readings from Last 3 Encounters:   08/14/23 68   02/02/23 74   10/20/20 76     Body mass index is 25.23 kg/m².      Review of Systems   Musculoskeletal:  Positive for back pain and joint pain.     Physical Exam  Nursing note reviewed.   Cardiovascular:      Rate and Rhythm: Normal rate and regular rhythm.   Pulmonary:      Effort: Pulmonary effort is normal. No respiratory distress.   Musculoskeletal:         General: Tenderness present.      Comments: Tenderness left lower thoracic pain.   Neurological:      Mental Status: She is alert and oriented to person, place, and time.   Psychiatric:         Mood and Affect: Mood normal.         Behavior: Behavior normal.         Thought Content: Thought content normal.         Judgment: Judgment normal.         Laboratory Reviewed ({Yes)  Lab Results   Component Value Date    WBC 6.77 02/02/2023    HGB 13.0 02/02/2023    HCT 42.0 02/02/2023     02/02/2023    CHOL 230 (H) 02/02/2023    TRIG 59 02/02/2023    HDL 86 (H) 02/02/2023    ALT 16 02/02/2023    AST 22 02/02/2023     02/02/2023    K 4.0 02/02/2023     02/02/2023    CREATININE 0.8 02/02/2023    BUN 12 02/02/2023    CO2 26 02/02/2023       Anita was seen today for back pain.    Diagnoses and all orders for this visit:    Lumbar spine pain  -     Ambulatory referral/consult to Physical/Occupational Therapy;  Future      Supportive care          Care Plan/Goals: Reviewed    Goals    None         Follow up: Follow up if symptoms worsen or fail to improve.    After visit summary was printed and given to patient upon discharge today.  Patient goals and care plan are included in After Visit Summary.

## 2023-08-15 NOTE — PROGRESS NOTES
"[facility]   Physical Therapy Initial Evaluation      Name: Anita Dykes [facility]  Clinic Number: [id number]    Therapy Diagnosis:   Encounter Diagnoses   Name Primary?    Lumbar spine pain     Chronic left-sided low back pain without sciatica         Physician: Sonya Mccall MD    Physician Orders: PT Eval and Treat   Medical Diagnosis from Referral: Lumbar spine pain [M54.50]  Evaluation Date: 8/16/2023  Authorization Period Expiration: 08/13/2024  Plan of Care Expiration: 10/11/2023  Progress Note Due: 09/16/2023  Visit # / Visits authorized: 1/ 1   FOTO: 1/ 3    Precautions: Standard     Time In: 7:38am  Time Out: 8:15am  Total Billable Time:  37 minutes    Subjective     Date of onset: late last year    History of current condition - Anita reports: pain in low back. She reports slipping and catching herself around the end of last year and this is when the pain started. She has been helping her mother our recently and thinks she has been aggravating it more due to increased housework. Her primary pain becomes prominent with prolonged sitting while driving and during car rides. She drives over an hour a day for work and to see her mom. Her pain is primarily around the left low back. She reports having "loose tendons" throughout the years. She has slight pain while sleeping and usually sleeps on her side. She also has pain when sitting slumped on the couch with lower extremities extended; has decreased pain when she sits on the couch with 90/90 knees and hips bent.     Falls: 0    Imaging: none     Prior Therapy: PT on her knee   Social History:  lives with their spouse  Occupation: half-way , standing most of the day  Prior Level of Function: WNL  Current Level of Function: WFL with pain and discomfort while driving and sitting on uneven surfaces    Pain:  Current 1/10, worst (driving) 7/10  Location: left low back   Description: Sharp  Aggravating Factors: car rides, bending  Easing " "Factors: heating pad and hot bath somewhat relief    Patients goals: no pain especially with driving and car rides     Medical History:   No past medical history on file.    Surgical History:   Anita Brock  has a past surgical history that includes Dislocated Left Patella.    Medications:   Anita currently has no medications in their medication list.    Allergies:   Review of patient's allergies indicates:  No Known Allergies     Objective      Posture: WNL, slight forward head posture    AROM:    Thoracolumbar  (single inclinometer at L4/5) AROM  (degrees) Pain/Dysfunction with Movement   Flexion WNL    Extension WNL Slight increase in pain   Right side bending WNL    Left side bending WNL    Right rotation WNL    Left rotation WNL        Strength:     L/E MMT Right Left Pain/Dysfunction with Movement   Hip Flexion 4/5 4/5    Hip Abduciton 4/5 4/5    Knee Flexion 4+/5 4+/5    Knee Extension 4+/5 4+/5    Ankle DF 5/5 5/5       Special test   - REJI: negative bilaterally (noted adductor tension with RLE)    Palpation: tender to palpation L lumbar paraspinals, L SI joint    Intake Outcome Measure for FOTO Lumbar Spine Survey    Therapist reviewed FOTO scores for Anita Brock on 8/16/2023.   FOTO report - see Media section or FOTO account episode details.    Intake Score: 48/100         Treatment     Total Treatment time (time-based codes) separate from Evaluation: 10 minutes     Anita received the treatments listed below:      therapeutic exercises to develop strength, endurance, ROM, flexibility, and core stabilization for 10 minutes including:    LTRs x5  SKTC 2x30"  Posterior pelvic tilts x10      Patient Education and Home Exercises     Education provided:   - HEP provided.     Written Home Exercises Provided: yes. Exercises were reviewed and Anita was able to demonstrate them prior to the end of the session.  Anita demonstrated good  understanding of the education provided. See EMR under Patient " Instructions for exercises provided during therapy sessions.    Assessment     Anita is a 52 y.o. female referred to outpatient Physical Therapy with a medical diagnosis of Lumbar spine pain. Patient presents with tension at the left lower multifidus muscle as well as tenderness around the left sacroiliac joint. Patient has limitations in lower extremity strength, flexibility, and endurance. She is restricted in performing ADLs such as bending over to  an object off the ground as well as driving.     Patient prognosis is Excellent.   Patient will benefit from skilled outpatient Physical Therapy to address the deficits stated above and in the chart below, provide patient /family education, and to maximize patientt's level of independence.     Plan of care discussed with patient: Yes  Patient's spiritual, cultural and educational needs considered and patient is agreeable to the plan of care and goals as stated below:     Anticipated Barriers for therapy: none    Medical Necessity is demonstrated by the following  History  Co-morbidities and personal factors that may impact the plan of care [x] LOW: no personal factors / co-morbidities  [] MODERATE: 1-2 personal factors / co-morbidities  [] HIGH: 3+ personal factors / co-morbidities    Personal Factors:   no deficits     Examination  Body Structures and Functions, activity limitations and participation restrictions that may impact the plan of care [x] LOW: addressing 1-2 elements  [] MODERATE: 3+ elements  [] HIGH: 4+ elements (please support below)         Clinical Presentation [x] LOW: stable  [] MODERATE: Evolving  [] HIGH: Unstable     Decision Making/ Complexity Score: low       Goals:  Short Term Goals: 4 weeks   Patient will be 50% independent with HEP.  Patient will be able to tolerate a 30 minutes of driving with 2/10 pain or less.    Long Term Goals: 8 weeks   Patient will be 100% independent with HEP.  Patient will be able to to tolerate a 1 hour car  ride and/or 1 hour of driving with 2/10 pain or less.  Patient will have 4+/5 strength or better in lower extremity musculature.   Patient will have 20 point increase in FOTO score to show improvements in functional activities.   Plan     Plan of care Certification: 8/16/2023 to 10/11/2023.    Outpatient Physical Therapy 2 times weekly for 8 weeks to include the following interventions: Electrical Stimulation IFC, Manual Therapy, Moist Heat/ Ice, Neuromuscular Re-ed, Patient Education, Self Care, Therapeutic Activities, and Therapeutic Exercise. And FDN.     Bebeto Quintero PT        Physician's Signature: _________________________________________ Date: ________________

## 2023-08-16 ENCOUNTER — CLINICAL SUPPORT (OUTPATIENT)
Dept: REHABILITATION | Facility: HOSPITAL | Age: 53
End: 2023-08-16
Payer: COMMERCIAL

## 2023-08-16 DIAGNOSIS — M54.50 LUMBAR SPINE PAIN: ICD-10-CM

## 2023-08-16 DIAGNOSIS — G89.29 CHRONIC LEFT-SIDED LOW BACK PAIN WITHOUT SCIATICA: ICD-10-CM

## 2023-08-16 DIAGNOSIS — M54.50 CHRONIC LEFT-SIDED LOW BACK PAIN WITHOUT SCIATICA: ICD-10-CM

## 2023-08-16 PROCEDURE — 97161 PT EVAL LOW COMPLEX 20 MIN: CPT | Mod: PN

## 2023-08-16 PROCEDURE — 97110 THERAPEUTIC EXERCISES: CPT | Mod: PN

## 2023-08-16 NOTE — PLAN OF CARE
"[facility]   Physical Therapy Initial Evaluation      Name: Anita [facility]  Clinic Number: [id number]    Therapy Diagnosis:   Encounter Diagnoses   Name Primary?    Lumbar spine pain     Chronic left-sided low back pain without sciatica         Physician: Sonya Mccall MD    Physician Orders: PT Eval and Treat   Medical Diagnosis from Referral: Lumbar spine pain [M54.50]  Evaluation Date: 8/16/2023  Authorization Period Expiration: 08/13/2024  Plan of Care Expiration: 10/11/2023  Progress Note Due: 09/16/2023  Visit # / Visits authorized: 1/ 1   FOTO: 1/ 3    Precautions: Standard     Time In: 7:38am  Time Out: 8:15am  Total Billable Time:  37 minutes    Subjective     Date of onset: late last year    History of current condition - Anita reports: pain in low back. She reports slipping and catching herself around the end of last year and this is when the pain started. She has been helping her mother our recently and thinks she has been aggravating it more due to increased housework. Her primary pain becomes prominent with prolonged sitting while driving and during car rides. She drives over an hour a day for work and to see her mom. Her pain is primarily around the left low back. She reports having "loose tendons" throughout the years. She has slight pain while sleeping and usually sleeps on her side. She also has pain when sitting slumped on the couch with lower extremities extended; has decreased pain when she sits on the couch with 90/90 knees and hips bent.     Falls: 0    Imaging: none     Prior Therapy: PT on her knee   Social History:  lives with their spouse  Occupation: longterm , standing most of the day  Prior Level of Function: WNL  Current Level of Function: WFL with pain and discomfort while driving and sitting on uneven surfaces    Pain:  Current 1/10, worst (driving) 7/10  Location: left low back   Description: Sharp  Aggravating Factors: car rides, bending  Easing " "Factors: heating pad and hot bath somewhat relief    Patients goals: no pain especially with driving and car rides     Medical History:   No past medical history on file.    Surgical History:   Anita Brock  has a past surgical history that includes Dislocated Left Patella.    Medications:   Anita currently has no medications in their medication list.    Allergies:   Review of patient's allergies indicates:  No Known Allergies     Objective      Posture: WNL, slight forward head posture    AROM:    Thoracolumbar  (single inclinometer at L4/5) AROM  (degrees) Pain/Dysfunction with Movement   Flexion WNL    Extension WNL Slight increase in pain   Right side bending WNL    Left side bending WNL    Right rotation WNL    Left rotation WNL        Strength:     L/E MMT Right Left Pain/Dysfunction with Movement   Hip Flexion 4/5 4/5    Hip Abduciton 4/5 4/5    Knee Flexion 4+/5 4+/5    Knee Extension 4+/5 4+/5    Ankle DF 5/5 5/5       Special test   - REJI: negative bilaterally (noted adductor tension with RLE)    Palpation: tender to palpation L lumbar paraspinals, L SI joint    Intake Outcome Measure for FOTO Lumbar Spine Survey    Therapist reviewed FOTO scores for Anita Brock on 8/16/2023.   FOTO report - see Media section or FOTO account episode details.    Intake Score: 48/100         Treatment     Total Treatment time (time-based codes) separate from Evaluation: 10 minutes     Anita received the treatments listed below:      therapeutic exercises to develop strength, endurance, ROM, flexibility, and core stabilization for 10 minutes including:    LTRs x5  SKTC 2x30"  Posterior pelvic tilts x10      Patient Education and Home Exercises     Education provided:   - HEP provided.     Written Home Exercises Provided: yes. Exercises were reviewed and Anita was able to demonstrate them prior to the end of the session.  Anita demonstrated good  understanding of the education provided. See EMR under Patient " Instructions for exercises provided during therapy sessions.    Assessment     Anita is a 52 y.o. female referred to outpatient Physical Therapy with a medical diagnosis of Lumbar spine pain. Patient presents with tension at the left lower multifidus muscle as well as tenderness around the left sacroiliac joint. Patient has limitations in lower extremity strength, flexibility, and endurance. She is restricted in performing ADLs such as bending over to  an object off the ground as well as driving. Therapy will focus on strengthening lower extremity musculature, increasing flexibility, as well as core stabilization exercises.     Patient prognosis is Excellent.   Patient will benefit from skilled outpatient Physical Therapy to address the deficits stated above and in the chart below, provide patient /family education, and to maximize patientt's level of independence.     Plan of care discussed with patient: Yes  Patient's spiritual, cultural and educational needs considered and patient is agreeable to the plan of care and goals as stated below:     Anticipated Barriers for therapy: none    Medical Necessity is demonstrated by the following  History  Co-morbidities and personal factors that may impact the plan of care [x] LOW: no personal factors / co-morbidities  [] MODERATE: 1-2 personal factors / co-morbidities  [] HIGH: 3+ personal factors / co-morbidities    Personal Factors:   no deficits     Examination  Body Structures and Functions, activity limitations and participation restrictions that may impact the plan of care [x] LOW: addressing 1-2 elements  [] MODERATE: 3+ elements  [] HIGH: 4+ elements (please support below)         Clinical Presentation [x] LOW: stable  [] MODERATE: Evolving  [] HIGH: Unstable     Decision Making/ Complexity Score: low       Goals:  Short Term Goals: 4 weeks   Patient will be 50% independent with HEP.  Patient will be able to tolerate a 30 minutes of driving with 2/10 pain or  less.    Long Term Goals: 8 weeks   Patient will be 100% independent with HEP.  Patient will be able to to tolerate a 1 hour car ride and/or 1 hour of driving with 2/10 pain or less.  Patient will have 4+/5 strength or better in lower extremity musculature.   Patient will have 20 point increase in FOTO score to show improvements in functional activities.   Plan     Plan of care Certification: 8/16/2023 to 10/11/2023.    Outpatient Physical Therapy 2 times weekly for 8 weeks to include the following interventions: Electrical Stimulation IFC, Manual Therapy, Moist Heat/ Ice, Neuromuscular Re-ed, Patient Education, Self Care, Therapeutic Activities, and Therapeutic Exercise. And FDN.     Bebeto Quintero PT        Physician's Signature: _________________________________________ Date: ________________

## 2023-08-21 ENCOUNTER — CLINICAL SUPPORT (OUTPATIENT)
Dept: REHABILITATION | Facility: HOSPITAL | Age: 53
End: 2023-08-21
Payer: COMMERCIAL

## 2023-08-21 DIAGNOSIS — G89.29 CHRONIC LEFT-SIDED LOW BACK PAIN WITHOUT SCIATICA: Primary | ICD-10-CM

## 2023-08-21 DIAGNOSIS — M54.50 CHRONIC LEFT-SIDED LOW BACK PAIN WITHOUT SCIATICA: Primary | ICD-10-CM

## 2023-08-21 PROCEDURE — 97112 NEUROMUSCULAR REEDUCATION: CPT | Mod: PN

## 2023-08-21 PROCEDURE — 97110 THERAPEUTIC EXERCISES: CPT | Mod: PN

## 2023-08-21 NOTE — PROGRESS NOTES
"[facility]   Physical Therapy Treatment Note      Name: Anita [facility]  Clinic Number: [id number]    Therapy Diagnosis:   Encounter Diagnosis   Name Primary?    Chronic left-sided low back pain without sciatica Yes     Physician: Sonya Mccall MD    Visit Date: 8/21/2023    Physician: Sonya Mccall MD     Physician Orders: PT Eval and Treat   Medical Diagnosis from Referral: Lumbar spine pain [M54.50]  Evaluation Date: 8/16/2023  Authorization Period Expiration: 08/13/2024  Plan of Care Expiration: 10/11/2023  Progress Note Due: 09/16/2023  Visit # / Visits authorized: 1/20 + eval  FOTO: 1/ 3     Precautions: Standard      Time In: 7:37am  Time Out: 8:16am  Total Billable Time:  44 minutes    PTA Visit #: 0/5       Subjective     Pt reports: improvement in back pain with the stretches. Had increased pain yesterday due to driving for a long period of time.   She was compliant with home exercise program.  Response to previous treatment: 1st follow up visit since initial evaluation  Functional change: no change    Pain: -/10 ( no number reported)  Location: left low back     Objective      Objective Measures updated at progress report unless specified.     Treatment     Anita received the treatments listed below:      therapeutic exercises to develop strength, endurance, ROM, flexibility, and core stabilization for 36 minutes including:    Bike 6 minutes for joint nutrition and range of motion  LTRs 2 minutes   SKTC 2x30"  Posterior pelvic tilts 3x10  Bridges 3x10  SLR 2x10 ea.   Paloff press 30# 2x10 ea.   Clamshells red TheraBand 2x10  Swiss ball rollouts x10 holding 5 seconds  Standing heel raises 2x10  Cat cow x10    Next visit: thread the needle, prone hip ext,     neuromuscular re-education activities to improve: Balance, Coordination, Sense, Proprioception, and Posture for 8 minutes. The following activities were included:    Posterior pelvic tilts with cues to exhale on " contraction   Bird dogs UE only, LE only x10 ea. (Progress to alternating next visit)    Patient Education and Home Exercises       Education provided:   - HEP provided at initial evaluation.     Written Home Exercises Provided: Patient instructed to cont prior HEP. Exercises were reviewed and Anita was able to demonstrate them prior to the end of the session.  Anita demonstrated good  understanding of the education provided. See EMR under Patient Instructions for exercises provided during therapy sessions    Assessment     Anita tolerated therapy session well with no complaints of pain. Focused on core and low back strengthening and flexibility, as well as lower extremity strengthening. Will progress core strengthening next visit. Continue progressing in POC as tolerated.     Anita Is progressing well towards her goals.   Pt prognosis is Excellent.     Pt will continue to benefit from skilled outpatient physical therapy to address the deficits listed in the problem list box on initial evaluation, provide pt/family education and to maximize pt's level of independence in the home and community environment.     Pt's spiritual, cultural and educational needs considered and pt agreeable to plan of care and goals.     Anticipated barriers to physical therapy: none    Goals:  Short Term Goals: 4 weeks   Patient will be 50% independent with HEP. Progressing  Patient will be able to tolerate a 30 minutes of driving with 2/10 pain or less. Progressing     Long Term Goals: 8 weeks   Patient will be 100% independent with HEP. Progressing  Patient will be able to to tolerate a 1 hour car ride and/or 1 hour of driving with 2/10 pain or less. Progressing  Patient will have 4+/5 strength or better in lower extremity musculature. Progressing  Patient will have 20 point increase in FOTO score to show improvements in functional activities. Progressing  Plan      Plan of care Certification: 8/16/2023 to 10/11/2023.     Outpatient  Physical Therapy 2 times weekly for 8 weeks to include the following interventions: Electrical Stimulation IFC, Manual Therapy, Moist Heat/ Ice, Neuromuscular Re-ed, Patient Education, Self Care, Therapeutic Activities, and Therapeutic Exercise. And FDN.     Bebeto Quintero, PT

## 2023-08-23 ENCOUNTER — CLINICAL SUPPORT (OUTPATIENT)
Dept: REHABILITATION | Facility: HOSPITAL | Age: 53
End: 2023-08-23
Payer: COMMERCIAL

## 2023-08-23 DIAGNOSIS — M54.50 CHRONIC LEFT-SIDED LOW BACK PAIN WITHOUT SCIATICA: Primary | ICD-10-CM

## 2023-08-23 DIAGNOSIS — G89.29 CHRONIC LEFT-SIDED LOW BACK PAIN WITHOUT SCIATICA: Primary | ICD-10-CM

## 2023-08-23 PROCEDURE — 97110 THERAPEUTIC EXERCISES: CPT | Mod: PN

## 2023-08-23 NOTE — PROGRESS NOTES
"[facility]   Physical Therapy Treatment Note      Name: Anita [facility]  Clinic Number: [id number]    Therapy Diagnosis:   Encounter Diagnosis   Name Primary?    Chronic left-sided low back pain without sciatica Yes       Physician: Sonya Mccall MD    Visit Date: 8/23/2023    Physician: Sonya Mccall MD     Physician Orders: PT Eval and Treat   Medical Diagnosis from Referral: Lumbar spine pain [M54.50]  Evaluation Date: 8/16/2023  Authorization Period Expiration: 08/13/2024  Plan of Care Expiration: 10/11/2023  Progress Note Due: 09/16/2023  Visit # / Visits authorized: 2/20 + eval  FOTO: 1/ 3     Precautions: Standard      Time In: 7:39am  Time Out: 8:19am  Total Billable Time:  40 minutes    PTA Visit #: 0/5       Subjective     Pt reports: no new flare up since she has not been driving as much. Has been noticing slight improvement.   She was compliant with home exercise program.  Response to previous treatment: good response to stretches  Functional change: no change    Pain: -/10 ( no number reported)   Location: left low back     Objective      Objective Measures updated at progress report unless specified.     Treatment     Anita received the treatments listed below:      therapeutic exercises to develop strength, endurance, ROM, flexibility, and core stabilization for 40 minutes including:    Bike 6 minutes for joint nutrition and range of motion, Level 2  LTRs 2 minutes   SKTC 2x30"  Posterior pelvic tilts 3x10  Bridges with ball squeeze  2x10  Clamshells red TheraBand 2x10  Swiss ball rollouts x10 holding 5 seconds  Thread the needle  Cat cow x10  Hip hike 2x10  Standing heel raises 2x10  Hip hike 2x10    Next visit: prone hip ext, STM, TRX squats, deadlifts     Not today:  SLR 2x10 ea.   Paloff press 30# 2x10 ea.     neuromuscular re-education activities to improve: Balance, Coordination, Sense, Proprioception, and Posture for -- minutes. The following activities " were included:    Not today:  Bird dogs UE only, LE only x10 ea. (Progress to alternating next visit)    Patient Education and Home Exercises       Education provided:   - HEP provided at initial evaluation.     Written Home Exercises Provided: Patient instructed to cont prior HEP. Exercises were reviewed and Anita was able to demonstrate them prior to the end of the session.  Anita demonstrated good  understanding of the education provided. See EMR under Patient Instructions for exercises provided during therapy sessions    Assessment     Anita tolerated therapy session well with no complaints of pain. She is progressing well in her lower extremity strengthening exercises as well as flexibility. Since she has not been driving long distances, she has not had recent flare ups in the low back. Continue progressing in POC as tolerated.     Anita Is progressing well towards her goals.   Pt prognosis is Excellent.     Pt will continue to benefit from skilled outpatient physical therapy to address the deficits listed in the problem list box on initial evaluation, provide pt/family education and to maximize pt's level of independence in the home and community environment.     Pt's spiritual, cultural and educational needs considered and pt agreeable to plan of care and goals.     Anticipated barriers to physical therapy: none    Goals:  Short Term Goals: 4 weeks   Patient will be 50% independent with HEP. Progressing  Patient will be able to tolerate a 30 minutes of driving with 2/10 pain or less. Progressing     Long Term Goals: 8 weeks   Patient will be 100% independent with HEP. Progressing  Patient will be able to to tolerate a 1 hour car ride and/or 1 hour of driving with 2/10 pain or less. Progressing  Patient will have 4+/5 strength or better in lower extremity musculature. Progressing  Patient will have 20 point increase in FOTO score to show improvements in functional activities. Progressing  Plan      Plan of  care Certification: 8/16/2023 to 10/11/2023.     Outpatient Physical Therapy 2 times weekly for 8 weeks to include the following interventions: Electrical Stimulation IFC, Manual Therapy, Moist Heat/ Ice, Neuromuscular Re-ed, Patient Education, Self Care, Therapeutic Activities, and Therapeutic Exercise. And FDN.     Bebeto Quintero, PT

## 2023-08-28 ENCOUNTER — CLINICAL SUPPORT (OUTPATIENT)
Dept: REHABILITATION | Facility: HOSPITAL | Age: 53
End: 2023-08-28
Payer: COMMERCIAL

## 2023-08-28 DIAGNOSIS — G89.29 CHRONIC LEFT-SIDED LOW BACK PAIN WITHOUT SCIATICA: Primary | ICD-10-CM

## 2023-08-28 DIAGNOSIS — M54.50 CHRONIC LEFT-SIDED LOW BACK PAIN WITHOUT SCIATICA: Primary | ICD-10-CM

## 2023-08-28 PROCEDURE — 97110 THERAPEUTIC EXERCISES: CPT | Mod: PN

## 2023-08-28 PROCEDURE — 97140 MANUAL THERAPY 1/> REGIONS: CPT | Mod: PN

## 2023-08-28 NOTE — PROGRESS NOTES
"[facility]   Physical Therapy Treatment Note      Name: Anita Brock  Clinic Number: [id number]    Therapy Diagnosis:   Encounter Diagnosis   Name Primary?    Chronic left-sided low back pain without sciatica Yes         Physician: Sonya Mccall MD    Visit Date: 8/28/2023    Physician: Sonya Mccall MD     Physician Orders: PT Eval and Treat   Medical Diagnosis from Referral: Lumbar spine pain [M54.50]  Evaluation Date: 8/16/2023  Authorization Period Expiration: 08/13/2024  Plan of Care Expiration: 10/11/2023  Progress Note Due: 09/16/2023  Visit # / Visits authorized: 3/20 + eval  FOTO: 2/ 3 TODAY     Precautions: Standard      Time In: 7:35am  Time Out: 8:22am  Total Billable Time:  42 minutes    PTA Visit #: 0/5       Subjective     Pt reports: improvement in back pain with long driving hours this weekend. She thinks the exercises and stretches have been improving her symptoms.   She was compliant with home exercise program.  Response to previous treatment: good response to stretches  Functional change: no change    Pain: 4/10   Location: left low back     Objective      Objective Measures updated at progress report unless specified.      Intake Outcome Measure for FOTO Lumbar Spine Survey     Therapist reviewed FOTO scores for Anita Brock on 8/16/2023.   FOTO report - see Media section or FOTO account episode details.     Intake Score: 48/100  NEW 08/28/2023: 57/100          Treatment     Anita received the treatments listed below:      therapeutic exercises to develop strength, endurance, ROM, flexibility, and core stabilization for 29 minutes including:    Bike 6 minutes for joint nutrition and range of motion, Level 2  LTRs 2 minutes   SKTC 2x30"  Posterior pelvic tilts + Bridges with ball squeeze  x15  7 way hip x5 ea.  Piriformis stretch 30" ea.    Not today:  Clamshells red TheraBand 2x10  Swiss ball rollouts x10 holding 5 seconds  Thread the needle  Cat " cow x10  Hip hike 2x10  Standing heel raises 2x10  Hip hike 2x10  SLR 2x10 ea.   Paloff press 30# 2x10 ea.     Next visit: prone hip ext, TRX squats, deadlifts, bird dog or dead bug       manual therapy techniques: Myofacial release and Soft tissue Mobilization were applied to the: left low back for 13 minutes, including:    Myofascial release to left lumbar paraspinals       neuromuscular re-education activities to improve: Balance, Coordination, Sense, Proprioception, and Posture for --- minutes. The following activities were included:    Not today:  Bird dogs UE only, LE only x10 ea. (Progress to alternating next visit)    Hot pack to the low back for 5 minutes.     Patient Education and Home Exercises       Education provided:   - HEP provided at initial evaluation.     Written Home Exercises Provided: Patient instructed to cont prior HEP. Exercises were reviewed and Anita was able to demonstrate them prior to the end of the session.  Anita demonstrated good  understanding of the education provided. See EMR under Patient Instructions for exercises provided during therapy sessions    Assessment     Anita tolerated therapy session well with no complaints of pain. Patient mentioned increased stiffness when exiting the car after prolonged driving. Patient educated on benefits of dry needling and possible use for next visit if pain continues. Included hip strengthening exercises such as 7 way hip followed by stretches to improve flexibility. Continue progressing in POC as tolerated.     Anita Is progressing well towards her goals.   Pt prognosis is Excellent.     Pt will continue to benefit from skilled outpatient physical therapy to address the deficits listed in the problem list box on initial evaluation, provide pt/family education and to maximize pt's level of independence in the home and community environment.     Pt's spiritual, cultural and educational needs considered and pt agreeable to plan of care and  goals.     Anticipated barriers to physical therapy: none    Goals:  Short Term Goals: 4 weeks   Patient will be 50% independent with HEP. Progressing  Patient will be able to tolerate a 30 minutes of driving with 2/10 pain or less. Progressing     Long Term Goals: 8 weeks   Patient will be 100% independent with HEP. Progressing  Patient will be able to to tolerate a 1 hour car ride and/or 1 hour of driving with 2/10 pain or less. Progressing  Patient will have 4+/5 strength or better in lower extremity musculature. Progressing  Patient will have 20 point increase in FOTO score to show improvements in functional activities. Progressing  Plan      Plan of care Certification: 8/16/2023 to 10/11/2023.     Outpatient Physical Therapy 2 times weekly for 8 weeks to include the following interventions: Electrical Stimulation IFC, Manual Therapy, Moist Heat/ Ice, Neuromuscular Re-ed, Patient Education, Self Care, Therapeutic Activities, and Therapeutic Exercise. And FDN.     Bebeto Quintero, PT

## 2023-08-29 NOTE — PROGRESS NOTES
"[facility]   Physical Therapy Treatment Note      Name: Anita Brock  Clinic Number: [id number]    Therapy Diagnosis:   Encounter Diagnosis   Name Primary?    Chronic left-sided low back pain without sciatica Yes         Physician: Sonya Mccall MD    Visit Date: 8/30/2023     Physician Orders: PT Eval and Treat   Medical Diagnosis from Referral: Lumbar spine pain [M54.50]  Evaluation Date: 8/16/2023  Authorization Period Expiration: 08/13/2024  Plan of Care Expiration: 10/11/2023  Progress Note Due: 09/16/2023  Visit # / Visits authorized: 4/20 + eval  FOTO: 2/ 3      Precautions: Standard      Time In: 7:36am  Time Out: 8:15am  Total Billable Time:  44 minutes    PTA Visit #: 0/5       Subjective     Pt reports: soreness in her low back after the soft tissue work and hip exercises the past 2 days. Patient states her low back will have increased pain with prolonged walking throughout the day.   She was compliant with home exercise program.  Response to previous treatment: good response to stretches  Functional change: no change    Pain: 4/10   Location: left low back     Objective      Objective Measures updated at progress report unless specified.      Intake Outcome Measure for FOTO Lumbar Spine Survey     Therapist reviewed FOTO scores for Anita Brock on 8/16/2023.   FOTO report - see Media section or FOTO account episode details.     Intake Score: 48/100  NEW 08/28/2023: 57/100          Treatment     Anita received the treatments listed below:      therapeutic exercises to develop strength, endurance, ROM, flexibility, and core stabilization for 44 minutes including:    Bike 3' minutes for joint nutrition and range of motion, Level 2  Elliptical 3'   LTRs 2 minutes   SKTC 2x30"  Posterior pelvic tilts 2x10  Piriformis stretch 30" ea.  TRX squats 2x10  [place] carry 1 lap each hand 20# KB  Side walks red TheraBand 1 lap  Monster walks red TheraBand 1 lap  Bird dogs " 2x10  Cat cow 2x10    Not today:  Clamshells red TheraBand 2x10  Swiss ball rollouts x10 holding 5 seconds  Thread the needle  Cat cow x10  Hip hike 2x10  Standing heel raises 2x10  SLR 2x10 ea.   Paloff press 30# 2x10 ea.   Bridges with ball squeeze  x15  7 way hip x5 ea.    Next visit: prone hip ext, deadlifts      manual therapy techniques: Myofacial release and Soft tissue Mobilization were applied to the: left low back for 00 minutes, including:    Myofascial release to left lumbar paraspinals       neuromuscular re-education activities to improve: Balance, Coordination, Sense, Proprioception, and Posture for --- minutes. The following activities were included:    Not today:  Bird dogs UE only, LE only x10 ea. (Progress to alternating next visit)    Hot pack to the low back for 5 minutes.     Patient Education and Home Exercises       Education provided:   - HEP provided at initial evaluation.     Written Home Exercises Provided: Patient instructed to cont prior HEP. Exercises were reviewed and Anita was able to demonstrate them prior to the end of the session.  Anita demonstrated good  understanding of the education provided. See EMR under Patient Instructions for exercises provided during therapy sessions    Assessment     Anita tolerated therapy session well with no new complaints of pain. She reports increased soreness after last visit but it has improved since. We focused on stretches at the beginning and end of our session to improve flexibility in the tissues. We introduced more functional movements such as squats into our session. Good form and posture noted with core stabilization activities. Continue progressing in POC as tolerated.       Anita Is progressing well towards her goals.   Pt prognosis is Excellent.     Pt will continue to benefit from skilled outpatient physical therapy to address the deficits listed in the problem list box on initial evaluation, provide pt/family education and to  maximize pt's level of independence in the home and community environment.     Pt's spiritual, cultural and educational needs considered and pt agreeable to plan of care and goals.     Anticipated barriers to physical therapy: none    Goals:  Short Term Goals: 4 weeks   Patient will be 50% independent with HEP. Progressing  Patient will be able to tolerate a 30 minutes of driving with 2/10 pain or less. Progressing     Long Term Goals: 8 weeks   Patient will be 100% independent with HEP. Progressing  Patient will be able to to tolerate a 1 hour car ride and/or 1 hour of driving with 2/10 pain or less. Progressing  Patient will have 4+/5 strength or better in lower extremity musculature. Progressing  Patient will have 20 point increase in FOTO score to show improvements in functional activities. Progressing  Plan      Plan of care Certification: 8/16/2023 to 10/11/2023.     Outpatient Physical Therapy 2 times weekly for 8 weeks to include the following interventions: Electrical Stimulation IFC, Manual Therapy, Moist Heat/ Ice, Neuromuscular Re-ed, Patient Education, Self Care, Therapeutic Activities, and Therapeutic Exercise. And FDN.     Bebeto Quintero, PT

## 2023-08-30 ENCOUNTER — CLINICAL SUPPORT (OUTPATIENT)
Dept: REHABILITATION | Facility: HOSPITAL | Age: 53
End: 2023-08-30
Payer: COMMERCIAL

## 2023-08-30 DIAGNOSIS — M54.50 CHRONIC LEFT-SIDED LOW BACK PAIN WITHOUT SCIATICA: Primary | ICD-10-CM

## 2023-08-30 DIAGNOSIS — G89.29 CHRONIC LEFT-SIDED LOW BACK PAIN WITHOUT SCIATICA: Primary | ICD-10-CM

## 2023-08-30 PROCEDURE — 97110 THERAPEUTIC EXERCISES: CPT | Mod: PN

## 2023-09-11 ENCOUNTER — CLINICAL SUPPORT (OUTPATIENT)
Dept: REHABILITATION | Facility: HOSPITAL | Age: 53
End: 2023-09-11
Payer: COMMERCIAL

## 2023-09-11 DIAGNOSIS — M54.50 CHRONIC LEFT-SIDED LOW BACK PAIN WITHOUT SCIATICA: Primary | ICD-10-CM

## 2023-09-11 DIAGNOSIS — G89.29 CHRONIC LEFT-SIDED LOW BACK PAIN WITHOUT SCIATICA: Primary | ICD-10-CM

## 2023-09-11 PROCEDURE — 97110 THERAPEUTIC EXERCISES: CPT | Mod: PN

## 2023-09-11 PROCEDURE — 97140 MANUAL THERAPY 1/> REGIONS: CPT | Mod: PN

## 2023-09-11 NOTE — PROGRESS NOTES
[facility]   Physical Therapy Progress Note      Name: Anita Brock  Clinic Number: [id number]    Therapy Diagnosis:   Encounter Diagnosis   Name Primary?    Chronic left-sided low back pain without sciatica Yes       Physician: Sonya Mccall MD    Visit Date: 9/11/2023     Physician Orders: PT Eval and Treat   Medical Diagnosis from Referral: Lumbar spine pain [M54.50]  Evaluation Date: 8/16/2023  Authorization Period Expiration: 08/13/2024  Plan of Care Expiration: 10/11/2023  Progress Note Due: 09/16/2023 NEXT MONDAY  Visit # / Visits authorized: 5/20 + eval  FOTO: 2/ 3      Precautions: Standard      Time In: 7:35 am  Time Out: 8:02am  Total Billable Time:  42 minutes    PTA Visit #: 0/5       Subjective     Pt reports: increased pain last week after prolonged walking at work. She states it feels like a sharp/stabbing pain. She been performing her stretches and exercises in the morning and evening.   She was compliant with home exercise program.  Response to previous treatment: good response to stretches  Functional change: no change    Pain: -/10 (not asked today)  Location: left low back     Objective      Objective Measures updated at progress report unless specified.      Strength:                                  L/E MMT Right Left Pain/Dysfunction with Movement   Hip Flexion 4/5 4/5  same from initial evaluation    Hip Abduciton 4+/5 4+/5  improved from initial evaluation    Knee Flexion 4+/5 4+/5     Knee Extension 4+/5 4+/5     Ankle DF 5/5 5/5       Leg length: right slightly shorter than the left    Intake Outcome Measure for FOTO Lumbar Spine Survey     Therapist reviewed FOTO scores for Anita Brock on 8/16/2023.   FOTO report - see Media section or FOTO account episode details.     Intake Score: 48/100  NEW 08/28/2023: 57/100          Treatment     Anita received the treatments listed below:      therapeutic exercises to develop strength, endurance, ROM,  "flexibility, and core stabilization for 28 minutes including:    Bike 6' minutes for joint nutrition and range of motion, Level 2  Pelvic tilts 3x10  Bridges 3x10  Medex Ext 40# 3x10  [place] carry 20# KB 2 laps ea.  Bird dogs x10 ea.    SLR 2x10 ea.   Paloff press 30# 2x10 ea.   Hip hikes 2x15 ea.     Not today:  Elliptical 3'   LTRs 2 minutes   SKTC 2x30"  Piriformis stretch 30" ea.  TRX squats 2x10  Side walks red TheraBand 1 lap  Monster walks red TheraBand 1 lap  Cat cow 2x10  Clamshells red TheraBand 2x10  Swiss ball rollouts x10 holding 5 seconds  Thread the needle  Cat cow x10  Hip hike 2x10  Standing heel raises 2x10  7 way hip x5 ea.    Next visit: prone hip ext, deadlifts      manual therapy techniques: Manual traction were applied to the: left low back for 12 minutes, including:    Manual lumbar traction       neuromuscular re-education activities to improve: Balance, Coordination, Sense, Proprioception, and Posture for --- minutes. The following activities were included:    Not today:  Bird dogs UE only, LE only x10 ea. (Progress to alternating next visit)    Hot pack to the low back for 00 minutes.     Patient Education and Home Exercises       Education provided:   - HEP provided at initial evaluation.     Written Home Exercises Provided: Patient instructed to cont prior HEP. Exercises were reviewed and Anita was able to demonstrate them prior to the end of the session.  Anita demonstrated good  understanding of the education provided. See EMR under Patient Instructions for exercises provided during therapy sessions    Assessment     Anita presents to therapy today reporting increased pain this past week. She has been compliant with her stretches and exercises, however, continues to have pain with prolonged standing at work. Communicated with patient about seeing doctor next week about imaging if symptoms continue. Also educated patient on benefits of dry needling and to consider for next visit if " symptoms increase. Performed manual lumbar traction today and patient reporting good stretching response in area that increases her symptoms. Followed with core and lumbar strengthening as well as overall lower extremity strengthening and flexibility. Continue progressing in POC as tolerated.       Anita Is progressing well towards her goals.   Pt prognosis is Excellent.     Pt will continue to benefit from skilled outpatient physical therapy to address the deficits listed in the problem list box on initial evaluation, provide pt/family education and to maximize pt's level of independence in the home and community environment.     Pt's spiritual, cultural and educational needs considered and pt agreeable to plan of care and goals.     Anticipated barriers to physical therapy: none    Goals:  Short Term Goals: 4 weeks   Patient will be 50% independent with HEP. MET 9/11/2023  Patient will be able to tolerate a 30 minutes of driving with 2/10 pain or less. Progressing     Long Term Goals: 8 weeks   Patient will be 100% independent with HEP. Progressing  Patient will be able to to tolerate a 1 hour car ride and/or 1 hour of driving with 2/10 pain or less. Progressing  Patient will have 4+/5 strength or better in lower extremity musculature. Progressing  Patient will have 20 point increase in FOTO score to show improvements in functional activities. Progressing  Plan      Plan of care Certification: 8/16/2023 to 10/11/2023.     Outpatient Physical Therapy 2 times weekly for 8 weeks to include the following interventions: Electrical Stimulation IFC, Manual Therapy, Moist Heat/ Ice, Neuromuscular Re-ed, Patient Education, Self Care, Therapeutic Activities, and Therapeutic Exercise. And FDN.     Bebeto Quintero, PT

## 2023-09-12 NOTE — PROGRESS NOTES
"[facility]   Physical Therapy Treatment Note      Name: Anita Brock  Clinic Number: [id number]    Therapy Diagnosis:   Encounter Diagnosis   Name Primary?    Chronic left-sided low back pain without sciatica Yes         Physician: Sonya Mccall MD    Visit Date: 9/13/2023     Physician Orders: PT Eval and Treat   Medical Diagnosis from Referral: Lumbar spine pain [M54.50]  Evaluation Date: 8/16/2023  Authorization Period Expiration: 08/13/2024  Plan of Care Expiration: 10/11/2023  Progress Note Due: 10/11/2023  Visit # / Visits authorized: 6/20 + eval  FOTO: 2/ 3      Precautions: Standard      Time In:7:29 am  Time Out:8:00am  Total Billable Time:  31 minutes    PTA Visit #: 0/5       Subjective     Pt reports: increased mid-back pain that started yesterday. She describes the pain as different than what she has been experiencing in her low back. She notices the pain when scooting back into a chair. She reports increased standing more than usual yesterday.   She was compliant with home exercise program.  Response to previous treatment: good response to stretches  Functional change: no change    Pain: -/10 (not asked today)  Location: left low back     Objective      Objective Measures updated at progress report unless specified.     Low trap MMT 3+/5 (no pain)  Rhomboids MMT 4-/5 (no pain)  Middle trap MMT 4-/5 (no pain)    Intake Outcome Measure for FOTO Lumbar Spine Survey     Therapist reviewed FOTO scores for Anita Brock on 8/16/2023.   FOTO report - see Media section or FOTO account episode details.     Intake Score: 48/100  NEW 08/28/2023: 57/100          Treatment     Anita received the treatments listed below:      therapeutic exercises to develop strength, endurance, ROM, flexibility, and core stabilization for 23 minutes including:      SKTC 2x30"  Piriformis stretch 2x30"  Thread the needle x10 ea.   Cat cow x20  Rhomboid stretch 2x30"    Not today:   Bike 6' " minutes for joint nutrition and range of motion, Level 2  Pelvic tilts 3x10  Bridges 3x10  Medex Ext 40# 3x10  [place] carry 20# KB 2 laps ea.  Bird dogs x10 ea.    SLR 2x10 ea.   Paloff press 30# 2x10 ea.   Elliptical 3'   LTRs 2 minutes   TRX squats 2x10  Side walks red TheraBand 1 lap  Monster walks red TheraBand 1 lap  Cat cow 2x10  Clamshells red TheraBand 2x10  Swiss ball rollouts x10 holding 5 seconds  Hip hike 2x10  Standing heel raises 2x10  7 way hip x5 ea.    Next visit: prone hip ext, deadlifts      manual therapy techniques: Manual traction were applied to the: left low back for 8 minutes, including:    Rotational thoracic mobilizations        neuromuscular re-education activities to improve: Balance, Coordination, Sense, Proprioception, and Posture for --- minutes. The following activities were included:    Not today:  Bird dogs UE only, LE only x10 ea. (Progress to alternating next visit)    Hot pack to the low back for 00 minutes.     Patient Education and Home Exercises       Education provided:   - HEP provided at initial evaluation.     Written Home Exercises Provided: Patient instructed to cont prior HEP. Exercises were reviewed and Anita was able to demonstrate them prior to the end of the session.  Anita demonstrated good  understanding of the education provided. See EMR under Patient Instructions for exercises provided during therapy sessions    Assessment     Anita presents to the clinic today with new complaints of pain in her lower thoracic/upper lumbar area. She is tender to palpation around in thoracic and lumbar paraspinals. Jamie Solis PT, DPT performed FDN today.  Following FDN, we focused on thoracic mobility and flexibility. Discussed thoarcic manipulation for next visit. Continue progressing in POC as tolerated.       Anita Is progressing well towards her goals.   Pt prognosis is Excellent.     Pt will continue to benefit from skilled outpatient physical therapy to address the  deficits listed in the problem list box on initial evaluation, provide pt/family education and to maximize pt's level of independence in the home and community environment.     Pt's spiritual, cultural and educational needs considered and pt agreeable to plan of care and goals.     Anticipated barriers to physical therapy: none    Goals:  Short Term Goals: 4 weeks   Patient will be 50% independent with HEP. MET 9/11/2023  Patient will be able to tolerate a 30 minutes of driving with 2/10 pain or less. Progressing     Long Term Goals: 8 weeks   Patient will be 100% independent with HEP. Progressing  Patient will be able to to tolerate a 1 hour car ride and/or 1 hour of driving with 2/10 pain or less. Progressing  Patient will have 4+/5 strength or better in lower extremity musculature. Progressing  Patient will have 20 point increase in FOTO score to show improvements in functional activities. Progressing  Plan      Plan of care Certification: 8/16/2023 to 10/11/2023.     Outpatient Physical Therapy 2 times weekly for 8 weeks to include the following interventions: Electrical Stimulation IFC, Manual Therapy, Moist Heat/ Ice, Neuromuscular Re-ed, Patient Education, Self Care, Therapeutic Activities, and Therapeutic Exercise. And FDN.     Bebeto Quintero, PT

## 2023-09-13 ENCOUNTER — CLINICAL SUPPORT (OUTPATIENT)
Dept: REHABILITATION | Facility: HOSPITAL | Age: 53
End: 2023-09-13
Payer: COMMERCIAL

## 2023-09-13 DIAGNOSIS — G89.29 CHRONIC LEFT-SIDED LOW BACK PAIN WITHOUT SCIATICA: Primary | ICD-10-CM

## 2023-09-13 DIAGNOSIS — M54.50 CHRONIC LEFT-SIDED LOW BACK PAIN WITHOUT SCIATICA: Primary | ICD-10-CM

## 2023-09-13 PROCEDURE — 97140 MANUAL THERAPY 1/> REGIONS: CPT | Mod: PN

## 2023-09-13 PROCEDURE — 97110 THERAPEUTIC EXERCISES: CPT | Mod: PN

## 2023-09-13 NOTE — PROGRESS NOTES
[facility]  Physical Therapy Functional Dry Needling Treatment Note     Date:  9/13/2023    Name: Anita Dykes [facility]  Clinic Number: [id number]    Therapy Diagnosis:   Encounter Diagnosis   Name Primary?    Chronic left-sided low back pain without sciatica Yes     Physician: Sonya Mccall MD    Start Time:  8:00 AM  Stop Time:  8:15 AM  Total Billable Time: 15 minutes    Subjective      Pt reports: she has been having an increase in lower thoracic and upper lumbar pain over the past few days.  See note by Bebeto Quintero    Pain: NT/10  Location: Bilateral upper lumbar and lower thoracic pain    Objective      See EMR under MEDIA for signed written consent provided.     Palpation Assessment to determine the necessity for Functional Dry Needling. Increased muscle tone and TTP at thoracic and lumbar paraspinals.     Anita received the following manual therapy techniques: STM B thoracic and lumbar paraspinals    Dry needling with Estim to lower thoracic and upper lumbar paraspinals.    Home Exercises and Patient Education      Education provided:   - Potential risks and benefits of dry needling       Anita demonstrated good  understanding of the education provided.     Assessment      Patient demonstrated appropriate response to Functional Dry Needling. good  rhythmical contractions observed with estim to treated muscle groups. Decreased pain noted after treatment.    Plan      Monitor response to Functional Dry Needling. Continue with Functional Dry Needling in POC as appropriate.     Jamie Solis, PT  9/13/2023

## 2023-09-15 NOTE — PROGRESS NOTES
"[facility]   Physical Therapy Treatment Note      Name: Anita Brock  Clinic Number: [id number]    Therapy Diagnosis:   Encounter Diagnosis   Name Primary?    Chronic left-sided low back pain without sciatica Yes           Physician: Sonya Mccall MD    Visit Date: 9/18/2023     Physician Orders: PT Eval and Treat   Medical Diagnosis from Referral: Lumbar spine pain [M54.50]  Evaluation Date: 8/16/2023  Authorization Period Expiration: 08/13/2024  Plan of Care Expiration: 10/11/2023  Progress Note Due: 10/11/2023  Visit # / Visits authorized: 7/20 + eval  FOTO: 2/ 3      Precautions: Standard      Time In:7:36 am  Time Out:8:20am  Total Billable Time:  46 minutes    PTA Visit #: 0/5       Subjective     Pt reports: improvement in mid-back pain after FDN last week. Continues to have left sided back pain with driving and after prolonged standing.    She was compliant with home exercise program.  Response to previous treatment: good response to stretches  Functional change: no change    Pain: -/10 (not asked today)  Location: left low back     Objective      Objective Measures updated at progress report unless specified.       Intake Outcome Measure for FOTO Lumbar Spine Survey     Therapist reviewed FOTO scores for Anita Brock on 8/16/2023.   FOTO report - see Media section or FOTO account episode details.     Intake Score: 48/100  NEW 08/28/2023: 57/100          Treatment     Anita received the treatments listed below:      therapeutic exercises to develop strength, endurance, ROM, flexibility, and core stabilization for 24 minutes including:    Bike 6'  Paloff press 30# 2x10 ea.  Bridges 3x10  [place] pose 3x30"     Next visit: prone hip ext, deadlifts, thoracic manipulation       manual therapy techniques: Manual traction were applied to the: left low back for 2 minutes, including:    Thoracic manipulation   Next visit: lumbar traction       neuromuscular re-education " activities to improve: Balance, Coordination, Sense, Proprioception, and Posture for 20 minutes. The following activities were included:    [place] rows 3x10 30#- for posture awareness  [place] carry 1 lap ea. 15# KB  Medex lumbar ext 40# 3x10- cues for core activation   Medex rotation 24# x20 ea.   Dead bugs 20x - cues for core activation   Bird dogs x10 ea.    Hot pack to the low back for 00 minutes.     Patient Education and Home Exercises       Education provided:   - HEP provided at initial evaluation.     Written Home Exercises Provided: Patient instructed to cont prior HEP. Exercises were reviewed and Anita was able to demonstrate them prior to the end of the session.  Anita demonstrated good  understanding of the education provided. See EMR under Patient Instructions for exercises provided during therapy sessions    Assessment     Anita presents to therapy today reporting improvements in mid- low back pain after FDN last week. She is continuing to have left sided low back pain after prolonged standing and driving. PT performed thoracic manipulation in supine to improve thoracic facet mobility. Educated patient on importance of core strengthening for posture. Educated patient on chronic knee pain causing compensations with gait. New HEP given to focus on LE and core strengthening as continuing focus on lumbar stretching. Continue progressing in POC as tolerated.       Anita Is progressing well towards her goals.   Pt prognosis is Excellent.     Pt will continue to benefit from skilled outpatient physical therapy to address the deficits listed in the problem list box on initial evaluation, provide pt/family education and to maximize pt's level of independence in the home and community environment.     Pt's spiritual, cultural and educational needs considered and pt agreeable to plan of care and goals.     Anticipated barriers to physical therapy: none    Goals:  Short Term Goals: 4 weeks   Patient will  be 50% independent with HEP. MET 9/11/2023  Patient will be able to tolerate a 30 minutes of driving with 2/10 pain or less. Progressing     Long Term Goals: 8 weeks   Patient will be 100% independent with HEP. Progressing  Patient will be able to to tolerate a 1 hour car ride and/or 1 hour of driving with 2/10 pain or less. Progressing  Patient will have 4+/5 strength or better in lower extremity musculature. Progressing  Patient will have 20 point increase in FOTO score to show improvements in functional activities. Progressing  Plan      Plan of care Certification: 8/16/2023 to 10/11/2023.     Outpatient Physical Therapy 2 times weekly for 8 weeks to include the following interventions: Electrical Stimulation IFC, Manual Therapy, Moist Heat/ Ice, Neuromuscular Re-ed, Patient Education, Self Care, Therapeutic Activities, and Therapeutic Exercise. And FDN.     Bebeto Quintero, PT

## 2023-09-18 ENCOUNTER — CLINICAL SUPPORT (OUTPATIENT)
Dept: REHABILITATION | Facility: HOSPITAL | Age: 53
End: 2023-09-18
Payer: COMMERCIAL

## 2023-09-18 DIAGNOSIS — M54.50 CHRONIC LEFT-SIDED LOW BACK PAIN WITHOUT SCIATICA: Primary | ICD-10-CM

## 2023-09-18 DIAGNOSIS — G89.29 CHRONIC LEFT-SIDED LOW BACK PAIN WITHOUT SCIATICA: Primary | ICD-10-CM

## 2023-09-18 PROCEDURE — 97110 THERAPEUTIC EXERCISES: CPT | Mod: PN

## 2023-09-18 PROCEDURE — 97112 NEUROMUSCULAR REEDUCATION: CPT | Mod: PN

## 2023-09-20 ENCOUNTER — CLINICAL SUPPORT (OUTPATIENT)
Dept: REHABILITATION | Facility: HOSPITAL | Age: 53
End: 2023-09-20
Payer: COMMERCIAL

## 2023-09-20 DIAGNOSIS — G89.29 CHRONIC LEFT-SIDED LOW BACK PAIN WITHOUT SCIATICA: Primary | ICD-10-CM

## 2023-09-20 DIAGNOSIS — M54.50 CHRONIC LEFT-SIDED LOW BACK PAIN WITHOUT SCIATICA: Primary | ICD-10-CM

## 2023-09-20 PROCEDURE — 97530 THERAPEUTIC ACTIVITIES: CPT | Mod: PN

## 2023-09-20 PROCEDURE — 97110 THERAPEUTIC EXERCISES: CPT | Mod: PN

## 2023-09-20 PROCEDURE — 97140 MANUAL THERAPY 1/> REGIONS: CPT | Mod: PN

## 2023-09-20 NOTE — PROGRESS NOTES
"[facility]   Physical Therapy Treatment Note      Name: Anita Brock  Clinic Number: [id number]    Therapy Diagnosis:   Encounter Diagnosis   Name Primary?    Chronic left-sided low back pain without sciatica Yes       Physician: Sonya Mccall MD    Visit Date: 9/20/2023     Physician Orders: PT Eval and Treat   Medical Diagnosis from Referral: Lumbar spine pain [M54.50]  Evaluation Date: 8/16/2023  Authorization Period Expiration: 08/13/2024  Plan of Care Expiration: 10/11/2023  Progress Note Due: 10/11/2023  Visit # / Visits authorized: 8/20 + eval  FOTO: 2/ 3      Precautions: Standard      Time In:7:35 am  Time Out:8:20 am  Total Billable Time:  45  minutes    PTA Visit #: 0/5       Subjective     Pt reports: soreness from last visit, however, no increase in back pain.    She was compliant with home exercise program.  Response to previous treatment: soreness  Functional change: no change    Pain: -/10 (not asked today)  Location: left low back     Objective      Objective Measures updated at progress report unless specified.       Intake Outcome Measure for FOTO Lumbar Spine Survey     Therapist reviewed FOTO scores for Anita Brock on 8/16/2023.   FOTO report - see Media section or FOTO account episode details.     Intake Score: 48/100  NEW 08/28/2023: 57/100          Treatment     Anita received the treatments listed below:      therapeutic exercises to develop strength, endurance, ROM, flexibility, and core stabilization for 23  minutes including:    Bike 6'  LTRs 2 min  Cable rotation 20# 20x ea.  Single limb Bridges x10 ea.  Clamshells 2x15 ea. Red TheraBand   Seht pose 3x30"   Posterior pelvic tilts 3x10    Next visit: prone hip ext      manual therapy techniques: Manual traction were applied to the: left low back for 9  minutes, including:    Manual lumbar traction       neuromuscular re-education activities to improve: Balance, Coordination, Sense, " Proprioception, and Posture for 00  minutes. The following activities were included:    Cable rows 3x10 30#- for posture awareness  Walkers farmers carry 1 lap ea. 15# KB  Medex lumbar ext 40# 3x10- cues for core activation   Medex rotation 24# x20 ea.   Dead bugs 20x - cues for core activation   Bird dogs x10 ea.    therapeutic activities to improve functional performance for 8  minutes, including:    Deadlift 35# 2x5-  cues for form and posture. Education to on how to use this technique at work when lifting heavy objects      Hot pack to the low back for 05 minutes.     Patient Education and Home Exercises       Education provided:   - HEP provided at initial evaluation.     Written Home Exercises Provided: Patient instructed to cont prior HEP. Exercises were reviewed and Anita was able to demonstrate them prior to the end of the session.  Anita demonstrated good  understanding of the education provided. See EMR under Patient Instructions for exercises provided during therapy sessions    Assessment     Anita tolerated therapy session well with no new complaints of pain. Continuing to focus on core and low back strengthening as well as flexibility. Introduced deadlifting with hip hinge to demonstrate proper lifting mechanics at work. Educated patient on chronic pain and maintaining strength after therapy with HEP. Talked to patient about seeing MD for imaging if pain continues. Continue progressing in POC as tolerated.     Anita Is progressing well towards her goals.   Pt prognosis is Excellent.     Pt will continue to benefit from skilled outpatient physical therapy to address the deficits listed in the problem list box on initial evaluation, provide pt/family education and to maximize pt's level of independence in the home and community environment.     Pt's spiritual, cultural and educational needs considered and pt agreeable to plan of care and goals.     Anticipated barriers to physical therapy:  none    Goals:  Short Term Goals: 4 weeks   Patient will be 50% independent with HEP. MET 9/11/2023  Patient will be able to tolerate a 30 minutes of driving with 2/10 pain or less. Progressing     Long Term Goals: 8 weeks   Patient will be 100% independent with HEP. Progressing  Patient will be able to to tolerate a 1 hour car ride and/or 1 hour of driving with 2/10 pain or less. Progressing  Patient will have 4+/5 strength or better in lower extremity musculature. Progressing  Patient will have 20 point increase in FOTO score to show improvements in functional activities. Progressing  Plan      Plan of care Certification: 8/16/2023 to 10/11/2023.     Outpatient Physical Therapy 2 times weekly for 8 weeks to include the following interventions: Electrical Stimulation IFC, Manual Therapy, Moist Heat/ Ice, Neuromuscular Re-ed, Patient Education, Self Care, Therapeutic Activities, and Therapeutic Exercise. And FDN.     Bebeto Quintero, PT

## 2023-09-26 NOTE — PROGRESS NOTES
[facility]   Physical Therapy Treatment Note      Name: Anita Brock  Clinic Number: [id number]    Therapy Diagnosis:   Encounter Diagnosis   Name Primary?    Chronic left-sided low back pain without sciatica Yes       Physician: Sonya Mccall MD    Visit Date: 9/27/2023     Physician Orders: PT Eval and Treat   Medical Diagnosis from Referral: Lumbar spine pain [M54.50]  Evaluation Date: 8/16/2023  Authorization Period Expiration: 08/13/2024  Plan of Care Expiration: 10/11/2023  Progress Note Due: 10/11/2023  Visit # / Visits authorized: 9/20 + eval  FOTO: 2/ 3      Precautions: Standard      Time In: 7:33 am  Time Out:8:17 am   Total Billable Time:  44 minutes    PTA Visit #: 0/5       Subjective     Pt reports: feeling about the same in low back pain. She continues to perform her stretches in the morning and at night.    She was compliant with home exercise program.  Response to previous treatment: soreness   Functional change: no change    Pain: 4/10   Location: left low back     Objective      Objective Measures updated at progress report unless specified.       Intake Outcome Measure for FOTO Lumbar Spine Survey     Therapist reviewed FOTO scores for Anita Brock on 8/16/2023.   FOTO report - see Media section or FOTO account episode details.     Intake Score: 48/100  NEW 08/28/2023: 57/100          Treatment     Anita received the treatments listed below:      therapeutic exercises to develop strength, endurance, ROM, flexibility, and core stabilization for 12 minutes including:    Elliptical 5 minutes for joint nutrition   LTRs 2 min    Not today:  Cable rotation 20# 20x ea.  Single limb Bridges x10 ea.  Clamshells 2x15 ea. Red TheraBand     Next visit: prone hip ext      manual therapy techniques: Manual traction were applied to the: left low back for 12 minutes, including:    FDN performed to left low back      neuromuscular re-education activities to improve:  Balance, Coordination, Sense, Proprioception, and Posture for 15 minutes. The following activities were included:    Seth pose 2 minutes   Posterior pelvic tilts 3x10 - with deep breathing   Cat cow 2x10     Not today:  Cable rows 3x10 30#- for posture awareness  Walkers farmers carry 1 lap ea. 15# KB  Medex lumbar ext 40# 3x10- cues for core activation   Medex rotation 24# x20 ea.   Dead bugs 20x - cues for core activation   Bird dogs x10 ea.    Next visit: multifidus contraction     therapeutic activities to improve functional performance for 00 minutes, including:    Deadlift 35# 2x5-  cues for form and posture. Education to on how to use this technique at work when lifting heavy objects      Hot pack to the low back for 5 minutes.     Patient Education and Home Exercises       Education provided:   - HEP provided at initial evaluation.     Written Home Exercises Provided: Patient instructed to cont prior HEP. Exercises were reviewed and Anita was able to demonstrate them prior to the end of the session.  Anita demonstrated good  understanding of the education provided. See EMR under Patient Instructions for exercises provided during therapy sessions    Assessment     Anita presents to the clinic today with reporting same symptoms as last week. Communicated to patient about reaching out to PCP about imaging to help rule in or out any other diagnoses. Ar Lieberman, PT performed FDN today on patient's low back. Following FDN, a hot pack was applied to the low back as well as completing stretches and mobility exercises. Recommended patient communicate with me about symptoms following FDN. Continue progressing in POC as tolerated.     Anita Is progressing well towards her goals.   Pt prognosis is Excellent.     Pt will continue to benefit from skilled outpatient physical therapy to address the deficits listed in the problem list box on initial evaluation, provide pt/family education and to maximize pt's level of  independence in the home and community environment.     Pt's spiritual, cultural and educational needs considered and pt agreeable to plan of care and goals.     Anticipated barriers to physical therapy: none    Goals:  Short Term Goals: 4 weeks   Patient will be 50% independent with HEP. MET 9/11/2023  Patient will be able to tolerate a 30 minutes of driving with 2/10 pain or less. Progressing     Long Term Goals: 8 weeks   Patient will be 100% independent with HEP. Progressing  Patient will be able to to tolerate a 1 hour car ride and/or 1 hour of driving with 2/10 pain or less. Progressing  Patient will have 4+/5 strength or better in lower extremity musculature. Progressing  Patient will have 20 point increase in FOTO score to show improvements in functional activities. Progressing  Plan      Plan of care Certification: 8/16/2023 to 10/11/2023.     Outpatient Physical Therapy 2 times weekly for 8 weeks to include the following interventions: Electrical Stimulation IFC, Manual Therapy, Moist Heat/ Ice, Neuromuscular Re-ed, Patient Education, Self Care, Therapeutic Activities, and Therapeutic Exercise. And FDN.     Bebeto Quintero, PT

## 2023-09-27 ENCOUNTER — CLINICAL SUPPORT (OUTPATIENT)
Dept: REHABILITATION | Facility: HOSPITAL | Age: 53
End: 2023-09-27
Payer: COMMERCIAL

## 2023-09-27 DIAGNOSIS — M54.50 CHRONIC LEFT-SIDED LOW BACK PAIN WITHOUT SCIATICA: Primary | ICD-10-CM

## 2023-09-27 DIAGNOSIS — G89.29 CHRONIC LEFT-SIDED LOW BACK PAIN WITHOUT SCIATICA: Primary | ICD-10-CM

## 2023-09-27 PROCEDURE — 97140 MANUAL THERAPY 1/> REGIONS: CPT | Mod: PN

## 2023-09-27 PROCEDURE — 97110 THERAPEUTIC EXERCISES: CPT | Mod: PN

## 2023-09-27 PROCEDURE — 97112 NEUROMUSCULAR REEDUCATION: CPT | Mod: PN

## 2023-10-04 ENCOUNTER — CLINICAL SUPPORT (OUTPATIENT)
Dept: REHABILITATION | Facility: HOSPITAL | Age: 53
End: 2023-10-04
Payer: COMMERCIAL

## 2023-10-04 DIAGNOSIS — G89.29 CHRONIC LEFT-SIDED LOW BACK PAIN WITHOUT SCIATICA: Primary | ICD-10-CM

## 2023-10-04 DIAGNOSIS — M54.50 CHRONIC LEFT-SIDED LOW BACK PAIN WITHOUT SCIATICA: Primary | ICD-10-CM

## 2023-10-04 PROCEDURE — 97112 NEUROMUSCULAR REEDUCATION: CPT | Mod: PN

## 2023-10-04 PROCEDURE — 97110 THERAPEUTIC EXERCISES: CPT | Mod: PN

## 2023-10-04 PROCEDURE — 97530 THERAPEUTIC ACTIVITIES: CPT | Mod: PN

## 2023-10-04 NOTE — PROGRESS NOTES
[facility]   Physical Therapy Treatment Note      Name: Anita Brock  Clinic Number: [id number]    Therapy Diagnosis:   Encounter Diagnosis   Name Primary?    Chronic left-sided low back pain without sciatica Yes         Physician: Sonya Mccall MD    Visit Date: 10/4/2023     Physician Orders: PT Eval and Treat   Medical Diagnosis from Referral: Lumbar spine pain [M54.50]  Evaluation Date: 8/16/2023  Authorization Period Expiration: 08/13/2024  Plan of Care Expiration: 10/11/2023  Progress Note Due: 10/11/2023  Visit # / Visits authorized: 10/20 + eval  FOTO: 2/ 3      Precautions: Standard      Time In: 7:34 am  Time Out:8:17 am   Total Billable Time:  43 minutes    PTA Visit #: 0/5       Subjective     Pt reports: feeling good 2 days after having FDN last visit. She reports a flare up in her symptoms this weekend after prolonged driving and walking.   She was compliant with home exercise program.  Response to previous treatment: soreness   Functional change: no change    Pain: 3/10   Location: left low back     Objective      Objective Measures updated at progress report unless specified.       Intake Outcome Measure for FOTO Lumbar Spine Survey     Therapist reviewed FOTO scores for Anita Brock on 8/16/2023.   FOTO report - see Media section or FOTO account episode details.     Intake Score: 48/100  NEW 08/28/2023: 57/100          Treatment     Anita received the treatments listed below:      therapeutic exercises to develop strength, endurance, ROM, flexibility, and core stabilization for 8 minutes including:    Elliptical 5 minutes for joint nutrition   Shuttle 5 bands with ball squeeze 3 min     Not today:  Cable rotation 20# 20x ea.  Single limb Bridges x10 ea.  Clamshells 2x15 ea. Red TheraBand     Next visit: prone hip ext      manual therapy techniques: Manual traction were applied to the: left low back for 00 minutes, including:    FDN performed to left low  "back      neuromuscular re-education activities to improve: Balance, Coordination, Sense, Proprioception, and Posture for 26 minutes. The following activities were included:    Seth pose 2 minutes   Multifidus contraction x10 ea.   Posterior pelvic tilts 2x10 3" hold- with deep breathing   Medex lumbar ext 40# 3x10- cues for core activation   Medex rotation 24# x20 ea.   Dead bugs 20x with ball - cues for core activation     Not today:  Cable rows 3x10 30#- for posture awareness  Cat cow   Bird dogs x10 ea.      therapeutic activities to improve functional performance for 08 minutes, including:    [place] carry 15# DB  SLED push/pull no weight added 2 laps     Not today:  Deadlift 35# 2x5-  cues for form and posture. Education to on how to use this technique at work when lifting heavy objects      Hot pack to the low back for 00 minutes.     Patient Education and Home Exercises       Education provided:   - HEP provided at initial evaluation.     Written Home Exercises Provided: Patient instructed to cont prior HEP. Exercises were reviewed and Anita was able to demonstrate them prior to the end of the session.  Anita demonstrated good  understanding of the education provided. See EMR under Patient Instructions for exercises provided during therapy sessions    Assessment     Anita tolerated therapy session well with no new complaints of pain. She reports 90% relief after FDN last visit, however she experienced a flare up this weekend with prolonged driving and walking. She has noticed good improvement and progress since her initial evaluation. Recommended we continue therapy until end of POC and decide on a plan for continuation. Continued focus on lumbar and core strengthening and stabilization as well as overall lower extremity strengthening. Continue progressing in POC as tolerated.     Anita Is progressing well towards her goals.   Pt prognosis is Excellent.     Pt will continue to benefit from skilled " outpatient physical therapy to address the deficits listed in the problem list box on initial evaluation, provide pt/family education and to maximize pt's level of independence in the home and community environment.     Pt's spiritual, cultural and educational needs considered and pt agreeable to plan of care and goals.     Anticipated barriers to physical therapy: none    Goals:  Short Term Goals: 4 weeks   Patient will be 50% independent with HEP. MET 9/11/2023  Patient will be able to tolerate a 30 minutes of driving with 2/10 pain or less. Progressing     Long Term Goals: 8 weeks   Patient will be 100% independent with HEP. Progressing  Patient will be able to to tolerate a 1 hour car ride and/or 1 hour of driving with 2/10 pain or less. Progressing  Patient will have 4+/5 strength or better in lower extremity musculature. Progressing  Patient will have 20 point increase in FOTO score to show improvements in functional activities. Progressing  Plan      Plan of care Certification: 8/16/2023 to 10/11/2023.     Outpatient Physical Therapy 2 times weekly for 8 weeks to include the following interventions: Electrical Stimulation IFC, Manual Therapy, Moist Heat/ Ice, Neuromuscular Re-ed, Patient Education, Self Care, Therapeutic Activities, and Therapeutic Exercise. And FDN.     Bebeto Quintero, PT

## 2023-10-05 NOTE — PROGRESS NOTES
[facility]   Physical Therapy Treatment Note      Name: Anita Brock  Clinic Number: [id number]    Therapy Diagnosis:   Encounter Diagnosis   Name Primary?    Chronic left-sided low back pain without sciatica Yes       Physician: Sonya Mccall MD    Visit Date: 10/9/2023     Physician Orders: PT Eval and Treat   Medical Diagnosis from Referral: Lumbar spine pain [M54.50]  Evaluation Date: 8/16/2023  Authorization Period Expiration: 08/14/2025  Plan of Care Expiration: 10/11/2023  Progress Note Due: 10/11/2023  Visit # / Visits authorized: 11/20 + eval  FOTO: 2/ 3      Precautions: Standard      Time In: 7:38 am  Time Out:8:15 am   Total Billable Time:  37 minutes    PTA Visit #: 0/5       Subjective     Pt reports: feeling fair today. Increased back pain while sitting in traffic this past Thursday. Would like to see her doctor to clear any other diagnoses. She reports that she has been trying to adjust her seats but has not found a comfortable position yet.   She was compliant with home exercise program.  Response to previous treatment: soreness   Functional change: no change    Pain: 2-3/10   Location: left low back     Objective      Objective Measures updated at progress report unless specified.       Intake Outcome Measure for FOTO Lumbar Spine Survey     Therapist reviewed FOTO scores for Anita Brock on 8/16/2023.   FOTO report - see Media section or FOTO account episode details.     Intake Score: 48/100  NEW 08/28/2023: 57/100          Treatment     Anita received the treatments listed below:      therapeutic exercises to develop strength, endurance, ROM, flexibility, and core stabilization for 8 minutes including:    Elliptical 5 minutes for joint nutrition   Prone hip ext 2x10    Not today:  Cable rotation 20# 20x ea.  Single limb Bridges x10 ea.  Clamshells 2x15 ea. Red TheraBand       manual therapy techniques: Manual traction were applied to the: left low back  "for 00 minutes, including:    FDN performed to left low back      neuromuscular re-education activities to improve: Balance, Coordination, Sense, Proprioception, and Posture for 21 minutes. The following activities were included:    Seth pose 2 minutes   Bird dog x10 ea.   Multifidus contraction x15 ea.   Medex lumbar ext 30# 3x10- cues for core activation   Dead bugs 20x with ball - cues for core activation     Not today:  Cable rows 3x10 30#- for posture awareness  Cat cow   Bird dogs x10 ea.  Medex rotation 24# x20 ea.   Posterior pelvic tilts 2x10 3" hold- with deep breathing       therapeutic activities to improve functional performance for 8 minutes, including:    [place] carry 15# DB 1 lap ea.   SLED push/pull no weight added 2 laps     Not today:  Deadlift 35# 2x5-  cues for form and posture. Education to on how to use this technique at work when lifting heavy objects      Hot pack to the low back for 00 minutes.     Patient Education and Home Exercises       Education provided:   - HEP provided at initial evaluation.     Written Home Exercises Provided: Patient instructed to cont prior HEP. Exercises were reviewed and Anita was able to demonstrate them prior to the end of the session.  Anita demonstrated good  understanding of the education provided. See EMR under Patient Instructions for exercises provided during therapy sessions    Assessment     Anita tolerated therapy session well with continued focus on lumbar and core strengthening and stabilization. Patient has noticed improvement in low back since starting therapy however reports she is still not 100% recovered. Education on pain neuroscience past visit. Contacted referring provider about addition re-evaluation for patient since symptoms are still present. Will continue progressing in POC as tolerated.     Anita Is progressing well towards her goals.   Pt prognosis is Excellent.     Pt will continue to benefit from skilled outpatient physical " therapy to address the deficits listed in the problem list box on initial evaluation, provide pt/family education and to maximize pt's level of independence in the home and community environment.     Pt's spiritual, cultural and educational needs considered and pt agreeable to plan of care and goals.     Anticipated barriers to physical therapy: none    Goals:  Short Term Goals: 4 weeks   Patient will be 50% independent with HEP. MET 9/11/2023  Patient will be able to tolerate a 30 minutes of driving with 2/10 pain or less. Progressing     Long Term Goals: 8 weeks   Patient will be 100% independent with HEP. Progressing  Patient will be able to to tolerate a 1 hour car ride and/or 1 hour of driving with 2/10 pain or less. Progressing  Patient will have 4+/5 strength or better in lower extremity musculature. Progressing  Patient will have 20 point increase in FOTO score to show improvements in functional activities. Progressing  Plan      Plan of care Certification: 8/16/2023 to 10/11/2023.     Outpatient Physical Therapy 2 times weekly for 8 weeks to include the following interventions: Electrical Stimulation IFC, Manual Therapy, Moist Heat/ Ice, Neuromuscular Re-ed, Patient Education, Self Care, Therapeutic Activities, and Therapeutic Exercise. And FDN.     Bebeto Quintero, PT

## 2023-10-09 ENCOUNTER — CLINICAL SUPPORT (OUTPATIENT)
Dept: REHABILITATION | Facility: HOSPITAL | Age: 53
End: 2023-10-09
Payer: COMMERCIAL

## 2023-10-09 DIAGNOSIS — G89.29 CHRONIC LEFT-SIDED LOW BACK PAIN WITHOUT SCIATICA: Primary | ICD-10-CM

## 2023-10-09 DIAGNOSIS — M54.50 CHRONIC LEFT-SIDED LOW BACK PAIN WITHOUT SCIATICA: Primary | ICD-10-CM

## 2023-10-09 PROCEDURE — 97112 NEUROMUSCULAR REEDUCATION: CPT | Mod: PN

## 2023-10-09 PROCEDURE — 97110 THERAPEUTIC EXERCISES: CPT | Mod: PN

## 2023-10-09 PROCEDURE — 97530 THERAPEUTIC ACTIVITIES: CPT | Mod: PN

## 2023-10-11 ENCOUNTER — CLINICAL SUPPORT (OUTPATIENT)
Dept: REHABILITATION | Facility: HOSPITAL | Age: 53
End: 2023-10-11
Payer: COMMERCIAL

## 2023-10-11 DIAGNOSIS — M54.50 CHRONIC LEFT-SIDED LOW BACK PAIN WITHOUT SCIATICA: Primary | ICD-10-CM

## 2023-10-11 DIAGNOSIS — G89.29 CHRONIC LEFT-SIDED LOW BACK PAIN WITHOUT SCIATICA: Primary | ICD-10-CM

## 2023-10-11 PROCEDURE — 97112 NEUROMUSCULAR REEDUCATION: CPT | Mod: PN

## 2023-10-11 PROCEDURE — 97140 MANUAL THERAPY 1/> REGIONS: CPT | Mod: PN

## 2023-10-11 PROCEDURE — 97110 THERAPEUTIC EXERCISES: CPT | Mod: PN

## 2023-10-11 NOTE — PLAN OF CARE
[facility]   Physical Therapy Updated Plan of Care Note      Name: Anita Brock  Clinic Number: [id number]    Therapy Diagnosis:   Encounter Diagnosis   Name Primary?    Chronic left-sided low back pain without sciatica Yes       Physician: Sonya Mccall MD    Visit Date: 10/11/2023     Physician Orders: PT Eval and Treat   Medical Diagnosis from Referral: Lumbar spine pain [M54.50]  Evaluation Date: 8/16/2023  Authorization Period Expiration: 08/14/2025  Plan of Care Expiration: 11/15/2023  Progress Note Due: 11/11/2023  Visit # / Visits authorized: 12/20 + eval  FOTO: 2/ 3      Precautions: Standard      Time In: 11:20 am  Time Out:12:05 am   Total Billable Time:  45 minutes    PTA Visit #: 0/5       Subjective     Pt reports: feeling pretty good today. She has not heard from her PCP yet but will reach out to them this week for an appointment.   She was compliant with home exercise program.  Response to previous treatment: no adverse symptoms   Functional change: no change    Pain: 2-3/10   Location: left low back     Objective      Objective Measures updated at progress report unless specified.     Strength:       L/E MMT Right Left Pain/Dysfunction with Movement   Hip Flexion 4/5 4-/5    Knee Flexion 5/5 4+/5    Knee Extension 5/5 4+/5          Intake Outcome Measure for FOTO Lumbar Spine Survey     Therapist reviewed FOTO scores for Anita Brock on 8/16/2023.   FOTO report - see Media section or FOTO account episode details.     Intake Score: 48/100  NEW 08/28/2023: 57/100          Patient Education and Home Exercises       Education provided:   - HEP provided at initial evaluation.     Written Home Exercises Provided: Patient instructed to cont prior HEP. Exercises were reviewed and Anita was able to demonstrate them prior to the end of the session.  Anita demonstrated good  understanding of the education provided. See EMR under Patient Instructions for exercises  provided during therapy sessions    Assessment     Anita presents to therapy today feeling fairly well today with no new complaints of pain to the low back. Noted improvement in strength in overall lower extremity musculature. She continues to be fairly weak in bilateral hip flexors. She reports continued pain and discomfort with prolonged drives over 1 hour. Patient plans to see PCP about symptoms and to possibly perform some imaging on her low back to rule in/out any other diagnoses. Will continue skilled physical therapy to improve low back pain and overall lower extremity and core strengthening.     Anita Is progressing well towards her goals.   Pt prognosis is Excellent.     Pt will continue to benefit from skilled outpatient physical therapy to address the deficits listed in the problem list box on initial evaluation, provide pt/family education and to maximize pt's level of independence in the home and community environment.     Pt's spiritual, cultural and educational needs considered and pt agreeable to plan of care and goals.     Anticipated barriers to physical therapy: none    Goals:  Short Term Goals: 4 weeks   Patient will be 50% independent with HEP. MET 9/11/2023  Patient will be able to tolerate a 30 minutes of driving with 2/10 pain or less. MET 10/11/2023     Long Term Goals: 8 weeks   Patient will be 100% independent with HEP. MET 10/11/2023  Patient will be able to to tolerate a 1 hour car ride and/or 1 hour of driving with 2/10 pain or less. Progressing  Patient will have 4+/5 strength or better in lower extremity musculature. Progressing  Patient will have 20 point increase in FOTO score to show improvements in functional activities. Progressing  Plan      Plan of care Certification: 10/11/2023 to 11/15/2023     Outpatient Physical Therapy 1 times weekly for 4 more weeks to include the following interventions: Electrical Stimulation IFC, Manual Therapy, Moist Heat/ Ice, Neuromuscular Re-ed,  Patient Education, Self Care, Therapeutic Activities, and Therapeutic Exercise. And FDN.     Bebeto Quintero, PT

## 2023-10-11 NOTE — PROGRESS NOTES
[facility]   Physical Therapy Updated Plan of Care Note      Name: Anita Brock  Clinic Number: [id number]    Therapy Diagnosis:   Encounter Diagnosis   Name Primary?    Chronic left-sided low back pain without sciatica Yes       Physician: Sonya Mccall MD    Visit Date: 10/11/2023     Physician Orders: PT Eval and Treat   Medical Diagnosis from Referral: Lumbar spine pain [M54.50]  Evaluation Date: 8/16/2023  Authorization Period Expiration: 08/14/2025  Plan of Care Expiration: 11/15/2023  Progress Note Due: 11/11/2023  Visit # / Visits authorized: 12/20 + eval  FOTO: 2/ 3      Precautions: Standard      Time In: 11:20 am  Time Out:12:05 am   Total Billable Time:  45 minutes    PTA Visit #: 0/5       Subjective     Pt reports: feeling pretty good today. She has not heard from her PCP yet but will reach out to them this week for an appointment.   She was compliant with home exercise program.  Response to previous treatment: no adverse symptoms   Functional change: no change    Pain: 2-3/10   Location: left low back     Objective      Objective Measures updated at progress report unless specified.     Strength:       L/E MMT Right Left Pain/Dysfunction with Movement   Hip Flexion 4/5 4-/5    Knee Flexion 5/5 4+/5    Knee Extension 5/5 4+/5          Intake Outcome Measure for FOTO Lumbar Spine Survey     Therapist reviewed FOTO scores for Anita Brock on 8/16/2023.   FOTO report - see Media section or FOTO account episode details.     Intake Score: 48/100  NEW 08/28/2023: 57/100          Treatment     Anita received the treatments listed below:      therapeutic exercises to develop strength, endurance, ROM, flexibility, and core stabilization for 10 minutes including:    Elliptical 5 minutes for joint nutrition   SLR 3x10    Not today:  Prone hip ext 2x10  Cable rotation 20# 20x ea.  Single limb Bridges x10 ea.  Clamshells 2x15 ea. Red TheraBand       manual therapy  "techniques: Manual traction were applied to the: left low back for 10 minutes, including:    Manual lumbar traction       neuromuscular re-education activities to improve: Balance, Coordination, Sense, Proprioception, and Posture for 25 minutes. The following activities were included:    SLED push/pull no weight added 2 laps - cueing for posture   Medex lumbar ext 40# 3x10- cues for core activation   Dead bugs 20x with ball - cues for core activation   Seth pose 2 minutes   Cat cow x20    Not today:  Cable rows 3x10 30#- for posture awareness  Bird dogs x10 ea.  Medex rotation 24# x20 ea.   Posterior pelvic tilts 2x10 3" hold- with deep breathing       therapeutic activities to improve functional performance for 00 minutes, including:    Not today:   [place] carry 15# DB 1 lap ea.   Deadlift 35# 2x5-  cues for form and posture. Education to on how to use this technique at work when lifting heavy objects      Hot pack to the low back for 00 minutes.     Patient Education and Home Exercises       Education provided:   - HEP provided at initial evaluation.     Written Home Exercises Provided: Patient instructed to cont prior HEP. Exercises were reviewed and Anita was able to demonstrate them prior to the end of the session.  Anita demonstrated good  understanding of the education provided. See EMR under Patient Instructions for exercises provided during therapy sessions    Assessment     Anita presents to therapy today feeling fairly well today with no new complaints of pain to the low back. Noted improvement in strength in overall lower extremity musculature. She continues to be fairly weak in bilateral hip flexors. She reports continued pain and discomfort with prolonged drives over 1 hour. Patient plans to see PCP about symptoms and to possibly perform some imaging on her low back to rule in/out any other diagnoses. Will continue skilled physical therapy to improve low back pain and overall lower extremity and " core strengthening.     Anita Is progressing well towards her goals.   Pt prognosis is Excellent.     Pt will continue to benefit from skilled outpatient physical therapy to address the deficits listed in the problem list box on initial evaluation, provide pt/family education and to maximize pt's level of independence in the home and community environment.     Pt's spiritual, cultural and educational needs considered and pt agreeable to plan of care and goals.     Anticipated barriers to physical therapy: none    Goals:  Short Term Goals: 4 weeks   Patient will be 50% independent with HEP. MET 9/11/2023  Patient will be able to tolerate a 30 minutes of driving with 2/10 pain or less. MET 10/11/2023     Long Term Goals: 8 weeks   Patient will be 100% independent with HEP. MET 10/11/2023  Patient will be able to to tolerate a 1 hour car ride and/or 1 hour of driving with 2/10 pain or less. Progressing  Patient will have 4+/5 strength or better in lower extremity musculature. Progressing  Patient will have 20 point increase in FOTO score to show improvements in functional activities. Progressing  Plan      Plan of care Certification: 10/11/2023 to 11/15/2023     Outpatient Physical Therapy 1 times weekly for 4 more weeks to include the following interventions: Electrical Stimulation IFC, Manual Therapy, Moist Heat/ Ice, Neuromuscular Re-ed, Patient Education, Self Care, Therapeutic Activities, and Therapeutic Exercise. And FDN.     Bebeto Quintero, PT

## 2023-10-17 NOTE — PROGRESS NOTES
[facility]   Physical Therapy Updated Plan of Care Note      Name: Anita Dykes [facility]  Clinic Number: [id number]    Therapy Diagnosis:   Encounter Diagnosis   Name Primary?    Chronic left-sided low back pain without sciatica Yes         Physician: Sonya Mccall MD    Visit Date: 10/18/2023     Physician Orders: PT Eval and Treat   Medical Diagnosis from Referral: Lumbar spine pain [M54.50]  Evaluation Date: 8/16/2023  Authorization Period Expiration: 08/14/2025  Plan of Care Expiration: 11/15/2023  Progress Note Due: 11/11/2023  Visit # / Visits authorized: 13/20 + eval  FOTO: 2/ 3      Precautions: Standard      Time In: 7:35 am  Time Out: 8:18  am   Total Billable Time:  43  minutes    PTA Visit #: 0/5       Subjective     Pt reports: messaging doctor recently about scheduling an appt for possible imaging. Feeling pretty well today.   She was compliant with home exercise program.  Response to previous treatment: no adverse symptoms   Functional change: no change    Pain: 3/10    Location: left low back     Objective      Objective Measures updated at progress report unless specified.     Treatment     Anita received the treatments listed below:      therapeutic exercises to develop strength, endurance, ROM, flexibility, and core stabilization for 18 minutes including:     Elliptical 5 minutes for joint nutrition   Matrix Leg ext 15# 2x10  Matrix hamstring curl 35# 2x10  Matrix hip ABD 45# 3x10  Matrix hip ADD 35# 3x10  Heel raises into stretch at stairs x20    Not today:  SLR  Prone hip ext 2x10  Cable rotation 20# 20x ea.  Single limb Bridges x10 ea.  Clamshells 2x15 ea. Red TheraBand       manual therapy techniques: Manual traction were applied to the: left low back for 00  minutes, including:    Manual lumbar traction       neuromuscular re-education activities to improve: Balance, Coordination, Sense, Proprioception, and Posture for 25  minutes. The following activities were  "included:    Hip hikes at stairs 2x10  SLED push/pull no weight added 2 laps - cueing for posture   Medex lumbar ext 40# 3x10- cues for core activation   Dead bugs 20x with ball - cues for core activation   Bird dogs 2x10  [place] carry 15# DB 1 lap ea.   Seth pose 2 minutes   Cat cow x20    Not today:  Cable rows 3x10 30#- for posture awareness  Medex rotation 24# x20 ea.   Posterior pelvic tilts 2x10 3" hold- with deep breathing       therapeutic activities to improve functional performance for 00 minutes, including:    Not today:   [place] carry 15# DB 1 lap ea.   Deadlift 35# 2x5-  cues for form and posture. Education to on how to use this technique at work when lifting heavy objects      Hot pack to the low back for 00 minutes.     Patient Education and Home Exercises       Education provided:   - HEP provided at initial evaluation.     Written Home Exercises Provided: Patient instructed to cont prior HEP. Exercises were reviewed and Anita was able to demonstrate them prior to the end of the session.  Anita demonstrated good  understanding of the education provided. See EMR under Patient Instructions for exercises provided during therapy sessions    Assessment     Anita tolerated therapy session well with no new complaints of pain. We are continuing to focus on overall lower extremity strengthening as well as core and lumbar stabilization. Introduced lower extremity strengthening machines for future gym use. Continue progressing in POC as tolerated.     Anita Is progressing well towards her goals.   Pt prognosis is Excellent.     Pt will continue to benefit from skilled outpatient physical therapy to address the deficits listed in the problem list box on initial evaluation, provide pt/family education and to maximize pt's level of independence in the home and community environment.     Pt's spiritual, cultural and educational needs considered and pt agreeable to plan of care and goals.     Anticipated " barriers to physical therapy: none    Goals:  Short Term Goals: 4 weeks   Patient will be 50% independent with HEP. MET 9/11/2023  Patient will be able to tolerate a 30 minutes of driving with 2/10 pain or less. MET 10/11/2023     Long Term Goals: 8 weeks   Patient will be 100% independent with HEP. MET 10/11/2023  Patient will be able to to tolerate a 1 hour car ride and/or 1 hour of driving with 2/10 pain or less. Progressing  Patient will have 4+/5 strength or better in lower extremity musculature. Progressing  Patient will have 20 point increase in FOTO score to show improvements in functional activities. Progressing  Plan      Plan of care Certification: 10/11/2023 to 11/15/2023     Outpatient Physical Therapy 1 times weekly for 4 more weeks to include the following interventions: Electrical Stimulation IFC, Manual Therapy, Moist Heat/ Ice, Neuromuscular Re-ed, Patient Education, Self Care, Therapeutic Activities, and Therapeutic Exercise. And FDN.     Bebeto Quintero, PT

## 2023-10-18 ENCOUNTER — CLINICAL SUPPORT (OUTPATIENT)
Dept: REHABILITATION | Facility: HOSPITAL | Age: 53
End: 2023-10-18
Payer: COMMERCIAL

## 2023-10-18 DIAGNOSIS — M54.50 CHRONIC LEFT-SIDED LOW BACK PAIN WITHOUT SCIATICA: Primary | ICD-10-CM

## 2023-10-18 DIAGNOSIS — G89.29 CHRONIC LEFT-SIDED LOW BACK PAIN WITHOUT SCIATICA: Primary | ICD-10-CM

## 2023-10-18 PROCEDURE — 97110 THERAPEUTIC EXERCISES: CPT | Mod: PN

## 2023-10-18 PROCEDURE — 97112 NEUROMUSCULAR REEDUCATION: CPT | Mod: PN

## 2023-10-24 NOTE — PROGRESS NOTES
[facility]   Physical Therapy Updated Plan of Care Note      Name: Anita Dykes [facility]  Clinic Number: [id number]    Therapy Diagnosis:   Encounter Diagnosis   Name Primary?    Chronic left-sided low back pain without sciatica Yes         Physician: Sonya Mccall MD    Visit Date: 10/25/2023     Physician Orders: PT Eval and Treat   Medical Diagnosis from Referral: Lumbar spine pain [M54.50]  Evaluation Date: 8/16/2023  Authorization Period Expiration: 08/14/2025  Plan of Care Expiration: 11/15/2023  Progress Note Due: 11/11/2023  Visit # / Visits authorized: 14/20 + eval  FOTO: 2/ 3      Precautions: Standard      Time In: 7:32 am  Time Out: 8:20  am   Total Billable Time:  48 minutes    PTA Visit #: 0/5       Subjective     Pt reports: feeling okay today. 3/10 pain today and 5/10 pain in the evening after work.   She was compliant with home exercise program.  Response to previous treatment: no adverse symptoms   Functional change: no change    Pain: 3/10    Location: left low back     Objective      Objective Measures updated at progress report unless specified.     Treatment     Anita received the treatments listed below:      therapeutic exercises to develop strength, endurance, ROM, flexibility, and core stabilization for 08 minutes including:     Elliptical 5 minutes for joint nutrition   Matrix hip ABD 45# 3x10  Matrix hip ADD 45# 3x10    Not today:  Heel raises into stretch at stairs x20  Matrix Leg ext 15# 2x10  Matrix hamstring curl 35# 2x10  SLR  Prone hip ext 2x10  Cable rotation 20# 20x ea.  Single limb Bridges x10 ea.  Clamshells 2x15 ea. Red TheraBand       manual therapy techniques: Manual traction were applied to the: left low back for 00  minutes, including:    Manual lumbar traction       neuromuscular re-education activities to improve: Balance, Coordination, Sense, Proprioception, and Posture for 40 minutes. The following activities were included:    SLED push/pull  "no weight added 2 laps - cueing for posture   Medex lumbar ext 40# 3x10- cues for core activation   Dead bugs 3x10 with ball - cues for core activation   Seth pose 2 minutes   Cat cow x20  Bridges on ball - cues for core activation 2x10  Table top heel taps 2x10  7 way hip x5 ea.     Not today:  Bird dogs 2x10  [place] carry 15# DB 1 lap ea.   Hip hikes at stairs 2x10  Cable rows 3x10 30#- for posture awareness  Medex rotation 24# x20 ea.   Posterior pelvic tilts 2x10 3" hold- with deep breathing       therapeutic activities to improve functional performance for 00 minutes, including:    Not today:   [place] carry 15# DB 1 lap ea.   Deadlift 35# 2x5-  cues for form and posture. Education to on how to use this technique at work when lifting heavy objects      Hot pack to the low back for 00 minutes.     Patient Education and Home Exercises       Education provided:   - HEP provided at initial evaluation.     Written Home Exercises Provided: Patient instructed to cont prior HEP. Exercises were reviewed and Anita was able to demonstrate them prior to the end of the session.  Anita demonstrated good  understanding of the education provided. See EMR under Patient Instructions for exercises provided during therapy sessions    Assessment     Anita tolerated therapy session well with no new complaints of pain. She will be seeing her PCP about possible imaging today. Educated patient on continuing working out after completion of therapy. Will provide HEP of exercises and progressions to follow at home. Continuing to focus on core stabilization and strengthening interventions as well as overall lower extremity strengthening. Performing activities on the exercise machines so patient is confident in using them in the future if she pursues a gym membership. Continue progressing in POC as tolerated.     Anita Is progressing well towards her goals.   Pt prognosis is Excellent.     Pt will continue to benefit from skilled " outpatient physical therapy to address the deficits listed in the problem list box on initial evaluation, provide pt/family education and to maximize pt's level of independence in the home and community environment.     Pt's spiritual, cultural and educational needs considered and pt agreeable to plan of care and goals.     Anticipated barriers to physical therapy: none    Goals:  Short Term Goals: 4 weeks   Patient will be 50% independent with HEP. MET 9/11/2023  Patient will be able to tolerate a 30 minutes of driving with 2/10 pain or less. MET 10/11/2023     Long Term Goals: 8 weeks   Patient will be 100% independent with HEP. MET 10/11/2023  Patient will be able to to tolerate a 1 hour car ride and/or 1 hour of driving with 2/10 pain or less. Progressing  Patient will have 4+/5 strength or better in lower extremity musculature. Progressing  Patient will have 20 point increase in FOTO score to show improvements in functional activities. Progressing  Plan      Plan of care Certification: 10/11/2023 to 11/15/2023     Outpatient Physical Therapy 1 times weekly for 4 more weeks to include the following interventions: Electrical Stimulation IFC, Manual Therapy, Moist Heat/ Ice, Neuromuscular Re-ed, Patient Education, Self Care, Therapeutic Activities, and Therapeutic Exercise. And FDN.     Bebeto Quintero, PT

## 2023-10-25 ENCOUNTER — OFFICE VISIT (OUTPATIENT)
Dept: INTERNAL MEDICINE | Facility: CLINIC | Age: 53
End: 2023-10-25
Payer: COMMERCIAL

## 2023-10-25 ENCOUNTER — CLINICAL SUPPORT (OUTPATIENT)
Dept: REHABILITATION | Facility: HOSPITAL | Age: 53
End: 2023-10-25
Payer: COMMERCIAL

## 2023-10-25 VITALS
HEART RATE: 88 BPM | SYSTOLIC BLOOD PRESSURE: 102 MMHG | OXYGEN SATURATION: 98 % | HEIGHT: 66 IN | BODY MASS INDEX: 25.75 KG/M2 | WEIGHT: 160.25 LBS | DIASTOLIC BLOOD PRESSURE: 66 MMHG | TEMPERATURE: 97 F

## 2023-10-25 DIAGNOSIS — G89.29 CHRONIC LEFT-SIDED LOW BACK PAIN WITHOUT SCIATICA: Primary | ICD-10-CM

## 2023-10-25 DIAGNOSIS — M54.50 CHRONIC LEFT-SIDED LOW BACK PAIN WITHOUT SCIATICA: Primary | ICD-10-CM

## 2023-10-25 DIAGNOSIS — M54.9 DORSALGIA, UNSPECIFIED: ICD-10-CM

## 2023-10-25 PROCEDURE — 3008F PR BODY MASS INDEX (BMI) DOCUMENTED: ICD-10-PCS | Mod: CPTII,S$GLB,, | Performed by: FAMILY MEDICINE

## 2023-10-25 PROCEDURE — 99999 PR PBB SHADOW E&M-EST. PATIENT-LVL III: CPT | Mod: PBBFAC,,, | Performed by: FAMILY MEDICINE

## 2023-10-25 PROCEDURE — 99213 PR OFFICE/OUTPT VISIT, EST, LEVL III, 20-29 MIN: ICD-10-PCS | Mod: S$GLB,,, | Performed by: FAMILY MEDICINE

## 2023-10-25 PROCEDURE — 1159F PR MEDICATION LIST DOCUMENTED IN MEDICAL RECORD: ICD-10-PCS | Mod: CPTII,S$GLB,, | Performed by: FAMILY MEDICINE

## 2023-10-25 PROCEDURE — 3078F DIAST BP <80 MM HG: CPT | Mod: CPTII,S$GLB,, | Performed by: FAMILY MEDICINE

## 2023-10-25 PROCEDURE — 3074F PR MOST RECENT SYSTOLIC BLOOD PRESSURE < 130 MM HG: ICD-10-PCS | Mod: CPTII,S$GLB,, | Performed by: FAMILY MEDICINE

## 2023-10-25 PROCEDURE — 3008F BODY MASS INDEX DOCD: CPT | Mod: CPTII,S$GLB,, | Performed by: FAMILY MEDICINE

## 2023-10-25 PROCEDURE — 1159F MED LIST DOCD IN RCRD: CPT | Mod: CPTII,S$GLB,, | Performed by: FAMILY MEDICINE

## 2023-10-25 PROCEDURE — 99999 PR PBB SHADOW E&M-EST. PATIENT-LVL III: ICD-10-PCS | Mod: PBBFAC,,, | Performed by: FAMILY MEDICINE

## 2023-10-25 PROCEDURE — 3074F SYST BP LT 130 MM HG: CPT | Mod: CPTII,S$GLB,, | Performed by: FAMILY MEDICINE

## 2023-10-25 PROCEDURE — 3078F PR MOST RECENT DIASTOLIC BLOOD PRESSURE < 80 MM HG: ICD-10-PCS | Mod: CPTII,S$GLB,, | Performed by: FAMILY MEDICINE

## 2023-10-25 PROCEDURE — 99213 OFFICE O/P EST LOW 20 MIN: CPT | Mod: S$GLB,,, | Performed by: FAMILY MEDICINE

## 2023-10-25 PROCEDURE — 97112 NEUROMUSCULAR REEDUCATION: CPT | Mod: PN

## 2023-10-25 PROCEDURE — 97110 THERAPEUTIC EXERCISES: CPT | Mod: PN

## 2023-10-25 NOTE — PROGRESS NOTES
Anita Dykes [facility]  10/25/2023  [id number]    Sonya Mccall MD  Patient Care Team:  Sonya Mccall MD as PCP - General (Family Medicine)  Ginger Huddleston LPN as Care Coordinator (Internal Medicine)          Visit Type:a scheduled routine follow-up visit    Chief Complaint:  Chief Complaint   Patient presents with    Follow-up     From PT       History of Present Illness:  Follow up after PT  Chronic left side lower back pain, with some radicular pain  Has been compliant with her PT.    Suggest imaging since not resolved with tx.  She does have improving pain.  She is doing all her activity.  However she still has pain with the ADLs.  She wants more of a definitive diagnosis to see what she should be doing mainstay.          History:  History reviewed. No pertinent past medical history.  Past Surgical History:   Procedure Laterality Date    Dislocated Left Patella       Family History   Problem Relation Age of Onset    Leukemia Son     Hypertension Mother     Breast cancer Mother     Thrombosis Mother         DVT in leg    Colon cancer Maternal Uncle     Ovarian cancer Neg Hx      Social History     Socioeconomic History    Marital status:    Tobacco Use    Smoking status: Never    Smokeless tobacco: Never   Substance and Sexual Activity    Alcohol use: Yes     Comment: socially    Drug use: No    Sexual activity: Yes     Partners: Male     Birth control/protection: Inserts     Patient Active Problem List   Diagnosis    Family history of breast cancer in mother    Chronic left-sided low back pain without sciatica     Review of patient's allergies indicates:  No Known Allergies    The following were reviewed at this visit: active problem list, medication list, allergies, family history, social history, and health maintenance.    Medications:  No current outpatient medications on file prior to visit.     No current facility-administered medications on file prior to visit.       Medications have been  reviewed and reconciled with patient at this visit.  Barriers to medications reviewed with patient.    Adverse reactions to current medications reviewed with patient..    Over the counter medications reviewed and reconciled with patient.    Exam:  Wt Readings from Last 3 Encounters:   10/25/23 72.7 kg (160 lb 4.4 oz)   08/14/23 70.9 kg (156 lb 4.9 oz)   07/13/23 71.2 kg (156 lb 15.5 oz)     Temp Readings from Last 3 Encounters:   10/25/23 97 °F (36.1 °C) (Tympanic)   08/14/23 96.5 °F (35.8 °C) (Tympanic)   07/13/23 98 °F (36.7 °C) (Temporal)     BP Readings from Last 3 Encounters:   10/25/23 102/66   08/14/23 106/64   02/02/23 108/68     Pulse Readings from Last 3 Encounters:   10/25/23 88   08/14/23 68   02/02/23 74     Body mass index is 25.87 kg/m².      Review of Systems   Musculoskeletal:  Positive for back pain.     Physical Exam  Nursing note reviewed.   Pulmonary:      Effort: Pulmonary effort is normal. No respiratory distress.   Neurological:      Mental Status: She is alert and oriented to person, place, and time.   Psychiatric:         Mood and Affect: Mood normal.         Behavior: Behavior normal.         Thought Content: Thought content normal.         Judgment: Judgment normal.         Laboratory Reviewed ({Yes)  Lab Results   Component Value Date    WBC 6.77 02/02/2023    HGB 13.0 02/02/2023    HCT 42.0 02/02/2023     02/02/2023    CHOL 230 (H) 02/02/2023    TRIG 59 02/02/2023    HDL 86 (H) 02/02/2023    ALT 16 02/02/2023    AST 22 02/02/2023     02/02/2023    K 4.0 02/02/2023     02/02/2023    CREATININE 0.8 02/02/2023    BUN 12 02/02/2023    CO2 26 02/02/2023       Anita was seen today for follow-up.    Diagnoses and all orders for this visit:    Chronic left-sided low back pain without sciatica  -     MRI Lumbar Spine Without Contrast; Future    Dorsalgia, unspecified  -     MRI Lumbar Spine Without Contrast; Future    May consider Pain Management for  Injection/modalities              Care Plan/Goals: Reviewed    Goals    None         Follow up: No follow-ups on file.    After visit summary was printed and given to patient upon discharge today.  Patient goals and care plan are included in After Visit Summary.

## 2023-11-01 ENCOUNTER — CLINICAL SUPPORT (OUTPATIENT)
Dept: REHABILITATION | Facility: HOSPITAL | Age: 53
End: 2023-11-01
Payer: COMMERCIAL

## 2023-11-01 DIAGNOSIS — M54.50 CHRONIC LEFT-SIDED LOW BACK PAIN WITHOUT SCIATICA: Primary | ICD-10-CM

## 2023-11-01 DIAGNOSIS — G89.29 CHRONIC LEFT-SIDED LOW BACK PAIN WITHOUT SCIATICA: Primary | ICD-10-CM

## 2023-11-01 PROCEDURE — 97140 MANUAL THERAPY 1/> REGIONS: CPT | Mod: PN

## 2023-11-01 PROCEDURE — 97530 THERAPEUTIC ACTIVITIES: CPT | Mod: PN

## 2023-11-01 PROCEDURE — 97110 THERAPEUTIC EXERCISES: CPT | Mod: PN

## 2023-11-01 PROCEDURE — 97112 NEUROMUSCULAR REEDUCATION: CPT | Mod: PN

## 2023-11-01 NOTE — PROGRESS NOTES
"[facility]   Physical Therapy Updated Plan of Care Note      Name: Anita Dykes [facility]  Clinic Number: [id number]    Therapy Diagnosis:   Encounter Diagnosis   Name Primary?    Chronic left-sided low back pain without sciatica Yes         Physician: Sonya Mccall MD    Visit Date: 11/1/2023     Physician Orders: PT Eval and Treat   Medical Diagnosis from Referral: Lumbar spine pain [M54.50]  Evaluation Date: 8/16/2023  Authorization Period Expiration: 08/14/2025  Plan of Care Expiration: 11/08/2023  Progress Note Due: 11/08/2023  Visit # / Visits authorized: 15/20 + eval  FOTO: 2/ 3      Precautions: Standard      Time In: 7:34 am  Time Out: 8:20 am   Total Billable Time:  46 minutes    PTA Visit #: 0/5       Subjective     Pt reports: feeling the same. Continuing to have low back pain at the end of the day and while driving.  Has MRI scheduled for Tuesday.   She was compliant with home exercise program.  Response to previous treatment: no adverse symptoms   Functional change: no change    Pain: 3/10    Location: left low back     Objective      Objective Measures updated at progress report unless specified.     Treatment     Anita received the treatments listed below:      therapeutic exercises to develop strength, endurance, ROM, flexibility, and core stabilization for 10 minutes including:     Elliptical 5 minutes for joint nutrition   Piriformis stretch 3x30"  Shuttle 5 bands 2 min; kick backs 1 band x15 ea.     Not today:  Matrix hip ABD 45# 3x10  Matrix hip ADD 45# 3x10  Heel raises into stretch at stairs x20  Matrix Leg ext 15# 2x10  Matrix hamstring curl 35# 2x10  SLR  Prone hip ext 2x10  Cable rotation 20# 20x ea.  Single limb Bridges x10 ea.  Clamshells 2x15 ea. Red TheraBand       manual therapy techniques: Manual traction were applied to the: left low back for 08  minutes, including:    Thoracic and lumbar PA mobilizations   Lumbar manipulation KARSON      neuromuscular " "re-education activities to improve: Balance, Coordination, Sense, Proprioception, and Posture for 20 minutes. The following activities were included:    Seated posterior pelvic tilts 2x10 3" hold- with deep breathing   Paloff press 30#   TRX squats 3x10  Swissball rollouts x10 ea direction    Not today:  Medex lumbar ext 40# 3x10- cues for core activation   Dead bugs 3x10 with ball - cues for core activation   Seth pose 2 minutes   Cat cow x20  Bridges on ball - cues for core activation 2x10  Table top heel taps 2x10  7 way hip x5 ea.   Bird dogs 2x10  [place] carry 15# DB 1 lap ea.   Hip hikes at stairs 2x10  Cable rows 3x10 30#- for posture awareness  Medex rotation 24# x20 ea.       therapeutic activities to improve functional performance for 08 minutes, including:    Weighted ball carries 3 laps   SLED push/pull 10# added 3 laps - cueing for posture       Hot pack to the low back for 00 minutes.     Patient Education and Home Exercises       Education provided:   - HEP provided at initial evaluation.     Written Home Exercises Provided: Patient instructed to cont prior HEP. Exercises were reviewed and Anita was able to demonstrate them prior to the end of the session.  Anita demonstrated good  understanding of the education provided. See EMR under Patient Instructions for exercises provided during therapy sessions    Assessment     Anita presents to therapy today with reports of continued pain in low back at the nd of the day and with prolonged driving. Patient recently had a visit with her PCP and she has a MRI scheduled next week. Noted hypomobility in certain segments of thoracic and lumbar regions. PA mobilizations as well as lumbar manipulation performed. No audible crepitus present. Continuing to focus on core and lumbar strengthening and flexibility. Continue progressing in POC as tolerated.     Anita Is progressing well towards her goals.   Pt prognosis is Excellent.     Pt will continue to benefit " from skilled outpatient physical therapy to address the deficits listed in the problem list box on initial evaluation, provide pt/family education and to maximize pt's level of independence in the home and community environment.     Pt's spiritual, cultural and educational needs considered and pt agreeable to plan of care and goals.     Anticipated barriers to physical therapy: none    Goals:  Short Term Goals: 4 weeks   Patient will be 50% independent with HEP. MET 9/11/2023  Patient will be able to tolerate a 30 minutes of driving with 2/10 pain or less. MET 10/11/2023     Long Term Goals: 8 weeks   Patient will be 100% independent with HEP. MET 10/11/2023  Patient will be able to to tolerate a 1 hour car ride and/or 1 hour of driving with 2/10 pain or less. Progressing  Patient will have 4+/5 strength or better in lower extremity musculature. Progressing  Patient will have 20 point increase in FOTO score to show improvements in functional activities. Progressing  Plan      Plan of care Certification: 10/11/2023 to 11/08/2023     Outpatient Physical Therapy 1 times weekly for 4 more weeks to include the following interventions: Electrical Stimulation IFC, Manual Therapy, Moist Heat/ Ice, Neuromuscular Re-ed, Patient Education, Self Care, Therapeutic Activities, and Therapeutic Exercise. And FDN.     Bebeto Quintero, PT

## 2023-11-08 ENCOUNTER — CLINICAL SUPPORT (OUTPATIENT)
Dept: REHABILITATION | Facility: HOSPITAL | Age: 53
End: 2023-11-08
Payer: COMMERCIAL

## 2023-11-08 DIAGNOSIS — M54.50 CHRONIC LEFT-SIDED LOW BACK PAIN WITHOUT SCIATICA: Primary | ICD-10-CM

## 2023-11-08 DIAGNOSIS — G89.29 CHRONIC LEFT-SIDED LOW BACK PAIN WITHOUT SCIATICA: Primary | ICD-10-CM

## 2023-11-08 PROCEDURE — 97530 THERAPEUTIC ACTIVITIES: CPT | Mod: PN

## 2023-11-08 NOTE — PLAN OF CARE
[facility]  Physical Therapy Discharge Note    Name: Anita Dykes \[facility]\""as  Clinic Number: [id number]    Therapy Diagnosis:   Encounter Diagnosis   Name Primary?    Chronic left-sided low back pain without sciatica Yes     Physician: Sonya Mccall MD    Physician Orders: PT Eval and Treat   Medical Diagnosis from Referral: Lumbar spine pain [M54.50]  Evaluation Date: 8/16/      Date of Last visit: 11/08/2023  Total Visits Received: 16    ASSESSMENT      Anita tolerated therapy session well with no new complaints of pain. PT and patient agreeing on plateau of symptoms and both agree for discharge today. Encouraged patient to message me or call the clinic if symptoms worsen or MD requesting more visits. Reviewed HEP and patient able to demonstrate understanding of weighted lower extremity machines to use in the gym.     Discharge reason: Patient has reached the maximum rehab potential for the present time    Discharge FOTO Score: 57/100    Goals:   Short Term Goals: 4 weeks   Patient will be 50% independent with HEP. MET 9/11/2023  Patient will be able to tolerate a 30 minutes of driving with 2/10 pain or less. MET 10/11/2023     Long Term Goals: 8 weeks   Patient will be 100% independent with HEP. MET 10/11/2023  Patient will be able to to tolerate a 1 hour car ride and/or 1 hour of driving with 2/10 pain or less. NOT MET  Patient will have 4+/5 strength or better in lower extremity musculature. NOT MET  Patient will have 20 point increase in FOTO score to show improvements in functional activities. NOT MET    PLAN   This patient is discharged from Physical Therapy      Bebeto Qunitero, PT

## 2023-11-08 NOTE — PROGRESS NOTES
[facility]   Physical Therapy Updated Plan of Care Note      Name: Anita Brock  Clinic Number: [id number]    Therapy Diagnosis:   Encounter Diagnosis   Name Primary?    Chronic left-sided low back pain without sciatica Yes         Physician: Sonya Mccall MD    Visit Date: 11/8/2023     Physician Orders: PT Eval and Treat   Medical Diagnosis from Referral: Lumbar spine pain [M54.50]  Evaluation Date: 8/16/2023  Authorization Period Expiration: 08/14/2025  Plan of Care Expiration: 11/08/2023 TODAY  Progress Note Due: 11/08/2023  Visit # / Visits authorized: 16/20 + eval  FOTO: 3/ 3      Precautions: Standard      Time In: 7:35 am  Time Out: 8:30 am   Total Billable Time:  55 minutes    PTA Visit #: 0/5       Subjective     Pt reports: unable to perform MRI yesterday. Is continuing to feel about the same with her symptoms for awhile. Plans to attend a gym soon.   She was compliant with home exercise program.  Response to previous treatment: no adverse symptoms   Functional change: no change    Pain: 3/10    Location: left low back     Objective      Strength:                                  L/E MMT Right Left Pain/Dysfunction with Movement   Hip Flexion 4/5 4/5     Hip Abduciton 4+/5 4+/5     Knee Flexion 5/5 5/5     Knee Extension 5/5 5/5     Ankle DF 5/5 5/5        Intake Outcome Measure for FOTO Lumbar Spine Survey     Therapist reviewed FOTO scores for Anita Brock on 8/16/2023.   FOTO report - see Media section or FOTO account episode details.     Intake Score: 48/100  08/28/2023: 57/100  11/08/2023: 57/100      Treatment     Anita received the treatments listed below:      therapeutic activities to improve functional performance for 55 minutes, including:    Reviewed HEP and equipment to use at the gym:  Elliptical 5 min.   Piriformis stretch with progressions  Posterior pelvic tilts with breathing technique  Dead bug  Bird dog   Matrix Leg ext 15# 2x10  Matrix  hamstring curl 35# 2x10  Matrix hip ABD 45# 2x10  Matrix hip ADD 45# 2x10  Paloff press black band   SLED push/pull 10# added 2 laps - cueing for posture     Patient Education and Home Exercises       Education provided:   - HEP provided at initial evaluation.     Written Home Exercises Provided: Patient instructed to cont prior HEP. Exercises were reviewed and Anita was able to demonstrate them prior to the end of the session.  Anita demonstrated good  understanding of the education provided. See EMR under Patient Instructions for exercises provided during therapy sessions    Assessment     Anita tolerated therapy session well with no new complaints of pain. PT and patient agreeing on plateau of symptoms and both agree for discharge today. Encouraged patient to message me or call the clinic if symptoms worsen or MD requesting more visits. Reviewed HEP and patient able to demonstrate understanding of weighted lower extremity machines to use in the gym.     Anita Is progressing well towards her goals.   Pt prognosis is Excellent.     Pt will continue to benefit from skilled outpatient physical therapy to address the deficits listed in the problem list box on initial evaluation, provide pt/family education and to maximize pt's level of independence in the home and community environment.     Pt's spiritual, cultural and educational needs considered and pt agreeable to plan of care and goals.     Anticipated barriers to physical therapy: none    Goals:  Short Term Goals: 4 weeks   Patient will be 50% independent with HEP. MET 9/11/2023  Patient will be able to tolerate a 30 minutes of driving with 2/10 pain or less. MET 10/11/2023     Long Term Goals: 8 weeks   Patient will be 100% independent with HEP. MET 10/11/2023  Patient will be able to to tolerate a 1 hour car ride and/or 1 hour of driving with 2/10 pain or less. NOT MET  Patient will have 4+/5 strength or better in lower extremity musculature. NOT MET  Patient  will have 20 point increase in FOTO score to show improvements in functional activities. NOT MET  Plan      Plan of care Certification: 10/11/2023 to 11/08/2023     Outpatient Physical Therapy 1 times weekly for 4 more weeks to include the following interventions: Electrical Stimulation IFC, Manual Therapy, Moist Heat/ Ice, Neuromuscular Re-ed, Patient Education, Self Care, Therapeutic Activities, and Therapeutic Exercise. And FDN.     Bebeto Quintero, PT

## 2024-01-10 ENCOUNTER — PATIENT MESSAGE (OUTPATIENT)
Dept: INTERNAL MEDICINE | Facility: CLINIC | Age: 54
End: 2024-01-10
Payer: COMMERCIAL

## 2024-03-15 ENCOUNTER — LAB VISIT (OUTPATIENT)
Dept: LAB | Facility: HOSPITAL | Age: 54
End: 2024-03-15
Attending: STUDENT IN AN ORGANIZED HEALTH CARE EDUCATION/TRAINING PROGRAM
Payer: COMMERCIAL

## 2024-03-15 ENCOUNTER — OFFICE VISIT (OUTPATIENT)
Dept: DERMATOLOGY | Facility: CLINIC | Age: 54
End: 2024-03-15
Payer: COMMERCIAL

## 2024-03-15 DIAGNOSIS — L65.9 ALOPECIA: ICD-10-CM

## 2024-03-15 DIAGNOSIS — L82.1 SEBORRHEIC KERATOSIS: ICD-10-CM

## 2024-03-15 DIAGNOSIS — L65.9 ALOPECIA: Primary | ICD-10-CM

## 2024-03-15 LAB
25(OH)D3+25(OH)D2 SERPL-MCNC: 38 NG/ML (ref 30–96)
FERRITIN SERPL-MCNC: 76 NG/ML (ref 20–300)
IRON SERPL-MCNC: 101 UG/DL (ref 30–160)
SATURATED IRON: 28 % (ref 20–50)
TOTAL IRON BINDING CAPACITY: 363 UG/DL (ref 250–450)
TRANSFERRIN SERPL-MCNC: 245 MG/DL (ref 200–375)
TSH SERPL DL<=0.005 MIU/L-ACNC: 1.71 UIU/ML (ref 0.4–4)

## 2024-03-15 PROCEDURE — 1159F MED LIST DOCD IN RCRD: CPT | Mod: CPTII,S$GLB,, | Performed by: STUDENT IN AN ORGANIZED HEALTH CARE EDUCATION/TRAINING PROGRAM

## 2024-03-15 PROCEDURE — 36415 COLL VENOUS BLD VENIPUNCTURE: CPT | Performed by: STUDENT IN AN ORGANIZED HEALTH CARE EDUCATION/TRAINING PROGRAM

## 2024-03-15 PROCEDURE — 83540 ASSAY OF IRON: CPT | Performed by: STUDENT IN AN ORGANIZED HEALTH CARE EDUCATION/TRAINING PROGRAM

## 2024-03-15 PROCEDURE — 99203 OFFICE O/P NEW LOW 30 MIN: CPT | Mod: S$GLB,,, | Performed by: STUDENT IN AN ORGANIZED HEALTH CARE EDUCATION/TRAINING PROGRAM

## 2024-03-15 PROCEDURE — 1160F RVW MEDS BY RX/DR IN RCRD: CPT | Mod: CPTII,S$GLB,, | Performed by: STUDENT IN AN ORGANIZED HEALTH CARE EDUCATION/TRAINING PROGRAM

## 2024-03-15 PROCEDURE — 84443 ASSAY THYROID STIM HORMONE: CPT | Performed by: STUDENT IN AN ORGANIZED HEALTH CARE EDUCATION/TRAINING PROGRAM

## 2024-03-15 PROCEDURE — 82728 ASSAY OF FERRITIN: CPT | Performed by: STUDENT IN AN ORGANIZED HEALTH CARE EDUCATION/TRAINING PROGRAM

## 2024-03-15 PROCEDURE — 99999 PR PBB SHADOW E&M-EST. PATIENT-LVL II: CPT | Mod: PBBFAC,,, | Performed by: STUDENT IN AN ORGANIZED HEALTH CARE EDUCATION/TRAINING PROGRAM

## 2024-03-15 PROCEDURE — 82306 VITAMIN D 25 HYDROXY: CPT | Performed by: STUDENT IN AN ORGANIZED HEALTH CARE EDUCATION/TRAINING PROGRAM

## 2024-03-15 NOTE — PROGRESS NOTES
Patient Information  Name: Anita Brock  : 1970  MRN: [id number]     Referring Physician:  Dr. Leon   Primary Care Physician:  Sonya Jaime MD   Date of Visit: 03/15/2024      Subjective:       Anita Brock is a 53 y.o. female who presents for   Chief Complaint   Patient presents with    Spot     C/o spot on nose, rough       Patient with new complaint of lesion(s)  Location: nose  Duration: year  Symptoms: rough  Relieving factors/Previous treatments: none    She also reports hx of hair loss. Going through menopause and was taken off OCP.    Patient was last seen:Visit date not found     Prior notes by myself reviewed.   Clinical documentation obtained by nursing staff reviewed.    Review of Systems   Skin:  Negative for itching and rash.        Objective:    Physical Exam   Constitutional: She appears well-developed and well-nourished. No distress.   Neurological: She is alert and oriented to person, place, and time. She is not disoriented.   Psychiatric: She has a normal mood and affect.   Skin:   Areas Examined (abnormalities noted in diagram):   Scalp / Hair Palpated and Inspected  Head / Face Inspection Performed              Diagram Legend     Erythematous scaling macule/papule c/w actinic keratosis       Vascular papule c/w angioma      Pigmented verrucoid papule/plaque c/w seborrheic keratosis      Yellow umbilicated papule c/w sebaceous hyperplasia      Irregularly shaped tan macule c/w lentigo     1-2 mm smooth white papules consistent with Milia      Movable subcutaneous cyst with punctum c/w epidermal inclusion cyst      Subcutaneous movable cyst c/w pilar cyst      Firm pink to brown papule c/w dermatofibroma      Pedunculated fleshy papule(s) c/w skin tag(s)      Evenly pigmented macule c/w junctional nevus     Mildly variegated pigmented, slightly irregular-bordered macule c/w mildly atypical nevus      Flesh colored to evenly pigmented papule c/w intradermal nevus        Pink pearly papule/plaque c/w basal cell carcinoma      Erythematous hyperkeratotic cursted plaque c/w SCC      Surgical scar with no sign of skin cancer recurrence      Open and closed comedones      Inflammatory papules and pustules      Verrucoid papule consistent consistent with wart     Erythematous eczematous patches and plaques     Dystrophic onycholytic nail with subungual debris c/w onychomycosis     Umbilicated papule    Erythematous-base heme-crusted tan verrucoid plaque consistent with inflamed seborrheic keratosis     Erythematous Silvery Scaling Plaque c/w Psoriasis     See annotation      No images are attached to the encounter or orders placed in the encounter.    [] Data reviewed  [] Independent review of test  [] Management discussed with another provider    Assessment / Plan:        Alopecia  -     TSH; Future; Expected date: 03/15/2024  -     Vitamin D; Future; Expected date: 03/15/2024  -     IRON AND TIBC; Future; Expected date: 03/15/2024  -     FERRITIN; Future; Expected date: 03/15/2024  - Recommend hair supplements    Seborrheic keratosis  These are benign inherited growths without a malignant potential. Reassurance given to patient. No treatment is necessary.              LOS NUMBER AND COMPLEXITY OF PROBLEMS    COMPLEXITY OF DATA RISK TOTAL TIME (m)   23332  03940 [] 1 self-limited or minor problem [] Minimal to none [] No treatment recommended or patient to monitor 15-29  10-19   77124  37962 Low  [] 2 or > self limited or minor problems  [x] 1 stable chronic illness  [x] 1 acute, uncomplicated illness or injury Limited (2)  [] Prior external notes from each unique source  [] Review result of each unique test  [] Order each unique test [x]  Low  OTC medications, minor skin biopsy 30-44 20-29   01648  38944 Moderate  []  1 or > chronic illness with progression, exacerbation or SE of treatment  []  2 or more stable chronic illnesses  []  1 acute illness with systemic symptoms  []  1 acute  complicated injury  []  1 undiagnosed new problem with uncertain prognosis Moderate (1/3 below)  [x]  3 or more data items        *Now includes assessment requiring independent historian  []  Independent interpretation of a test  []  Discuss management/test with another provider Moderate  []  Prescription drug mgmt  []  Minor surgery with risk discussed  []  Mgmt limited by social determinates 45-59  30-39   59844  21150 High  []  1 or more chronic illness with severe exacerbation, progression or SE of treatment  []  1 acute or chronic illness/injury that poses a threat to life or bodily function Extensive (2/3 below)  []  3 or more data items        *Now includes assessment requiring independent historian.  []  Independent interpretation of a test  []  Discuss management/test with another provider High  []  Major surgery with risk discussed  []  Drug therapy requiring intensive monitoring for toxicity  []  Hospitalization  []  Decision for DNR 60-74  40-54      No follow-ups on file.    Jamila Deleon MD, FAAD  [facility]

## 2024-03-20 ENCOUNTER — HOSPITAL ENCOUNTER (OUTPATIENT)
Dept: RADIOLOGY | Facility: HOSPITAL | Age: 54
Discharge: HOME | End: 2024-03-20
Attending: OBSTETRICS & GYNECOLOGY
Payer: COMMERCIAL

## 2024-03-20 DIAGNOSIS — Z12.31 ENCOUNTER FOR SCREENING MAMMOGRAM FOR MALIGNANT NEOPLASM OF BREAST: ICD-10-CM

## 2024-04-02 ENCOUNTER — OFFICE VISIT (OUTPATIENT)
Dept: OBSTETRICS AND GYNECOLOGY | Facility: CLINIC | Age: 54
End: 2024-04-02
Payer: COMMERCIAL

## 2024-04-02 VITALS
WEIGHT: 164.69 LBS | SYSTOLIC BLOOD PRESSURE: 100 MMHG | BODY MASS INDEX: 26.47 KG/M2 | HEIGHT: 66 IN | DIASTOLIC BLOOD PRESSURE: 70 MMHG

## 2024-04-02 DIAGNOSIS — Z01.419 ENCOUNTER FOR GYNECOLOGICAL EXAMINATION WITHOUT ABNORMAL FINDING: ICD-10-CM

## 2024-04-02 DIAGNOSIS — Z78.0 MENOPAUSE: Primary | ICD-10-CM

## 2024-04-02 PROCEDURE — 3074F SYST BP LT 130 MM HG: CPT | Mod: CPTII,S$GLB,, | Performed by: OBSTETRICS & GYNECOLOGY

## 2024-04-02 PROCEDURE — 99999 PR PBB SHADOW E&M-EST. PATIENT-LVL II: CPT | Mod: PBBFAC,,, | Performed by: OBSTETRICS & GYNECOLOGY

## 2024-04-02 PROCEDURE — 1159F MED LIST DOCD IN RCRD: CPT | Mod: CPTII,S$GLB,, | Performed by: OBSTETRICS & GYNECOLOGY

## 2024-04-02 PROCEDURE — 3078F DIAST BP <80 MM HG: CPT | Mod: CPTII,S$GLB,, | Performed by: OBSTETRICS & GYNECOLOGY

## 2024-04-02 PROCEDURE — 3008F BODY MASS INDEX DOCD: CPT | Mod: CPTII,S$GLB,, | Performed by: OBSTETRICS & GYNECOLOGY

## 2024-04-02 PROCEDURE — 99396 PREV VISIT EST AGE 40-64: CPT | Mod: S$GLB,,, | Performed by: OBSTETRICS & GYNECOLOGY

## 2024-04-02 NOTE — PROGRESS NOTES
"  Subjective:       Patient ID: Anita Brock is a 53 y.o. female.    Chief Complaint:  Well Woman      History of Present Illness  HPI  Patient presents to day for annual exam , postmenopausal for one year   No gyn complaints, no vaginal bleeding or pelvic pain noted.   Hormonal therapy reviewed and discussed.   Having vasomotor symptoms that are mild since stopping Nuvaring   Maternal history of breast cancer, so patient is reluctant to start HRT  Discussed Veozah as an option.   Not on formulary, will need pre auth, will need LFT's at initiation then Q 3 months for 9 months for low risk of liver enzyme elevation in the studies of the drug   Preventive screening exam indication and testing reviewed and discussed.    Health Maintenance   Topic Date Due    TETANUS VACCINE  Never done    Colorectal Cancer Screening  Never done    Shingles Vaccine (1 of 2) Never done    Mammogram  2024    Lipid Panel  2028    Hepatitis C Screening  Completed     GYN & OB History  No LMP recorded.   Date of Last Pap: No result found    OB History    Para Term  AB Living   2 2 2     2   SAB IAB Ectopic Multiple Live Births           2      # Outcome Date GA Lbr Oscar/2nd Weight Sex Delivery Anes PTL Lv   2 Term 04 40w0d  3.912 kg (8 lb 10 oz) M Vag-Spont EPI N DEBORA   1 Term 11/15/98 40w0d  3.997 kg (8 lb 13 oz) M Vag-Spont EPI N DEBORA       Review of Systems  Review of Systems        Objective:   /70   Ht 5' 6" (1.676 m)   Wt 74.7 kg (164 lb 10.9 oz)   BMI 26.58 kg/m²    Physical Exam:   Constitutional: She appears well-developed and well-nourished. No distress.      Neck: No JVD present. No thyroid mass and no thyromegaly present.    Cardiovascular:  Normal rate and regular rhythm.                  Abdominal: Soft. Bowel sounds are normal. No hernia. Hernia confirmed negative in the ventral area, confirmed negative in the right inguinal area and confirmed negative in the left inguinal area.   "   Genitourinary:    Vagina, uterus and rectum normal.   The external female genitalia was normal.     Labial bartholins normal.There is no rash, tenderness, lesion or injury on the right labia. There is no rash, tenderness, lesion or injury on the left labia. Cervix is normal. Right adnexum displays no mass, no tenderness and no fullness. Left adnexum displays no mass, no tenderness and no fullness. No erythema, vaginal discharge, tenderness or bleeding in the vagina.    No foreign body in the vagina.   Cervix exhibits no motion tenderness, no discharge and no friability. Uterus is not deviated, not enlarged, not fixed and not tender. Normal urethral meatus.Urethra findings: no tendernessBladder findings: no bladder tenderness                     Assessment:        1. Menopause    2. Encounter for gynecological examination without abnormal finding                Plan:      Would like to follow vasomotor symptoms with no treatment for a while longer       Anita was seen today for well woman.    Diagnoses and all orders for this visit:    Menopause    Encounter for gynecological examination without abnormal finding

## 2024-04-05 ENCOUNTER — HOSPITAL ENCOUNTER (OUTPATIENT)
Dept: RADIOLOGY | Facility: HOSPITAL | Age: 54
Discharge: HOME | End: 2024-04-05
Attending: OBSTETRICS & GYNECOLOGY
Payer: COMMERCIAL

## 2024-04-05 DIAGNOSIS — R92.8 ABNORMAL MAMMOGRAM: ICD-10-CM

## 2024-04-05 PROCEDURE — 76642 ULTRASOUND BREAST LIMITED: CPT | Mod: 26,LT,, | Performed by: RADIOLOGY

## 2024-04-05 PROCEDURE — 76642 ULTRASOUND BREAST LIMITED: CPT | Mod: TC,PO,LT

## 2024-04-05 PROCEDURE — 77062 BREAST TOMOSYNTHESIS BI: CPT | Mod: TC,PO

## 2024-04-05 PROCEDURE — 77062 BREAST TOMOSYNTHESIS BI: CPT | Mod: 26,,, | Performed by: RADIOLOGY

## 2024-04-05 PROCEDURE — 77066 DX MAMMO INCL CAD BI: CPT | Mod: 26,,, | Performed by: RADIOLOGY

## 2024-09-04 ENCOUNTER — OFFICE VISIT (OUTPATIENT)
Dept: INTERNAL MEDICINE | Facility: CLINIC | Age: 54
End: 2024-09-04
Payer: COMMERCIAL

## 2024-09-04 VITALS
TEMPERATURE: 98 F | RESPIRATION RATE: 20 BRPM | BODY MASS INDEX: 27.15 KG/M2 | WEIGHT: 168.19 LBS | SYSTOLIC BLOOD PRESSURE: 104 MMHG | HEART RATE: 75 BPM | OXYGEN SATURATION: 99 % | DIASTOLIC BLOOD PRESSURE: 68 MMHG

## 2024-09-04 DIAGNOSIS — R52 BODY ACHES: ICD-10-CM

## 2024-09-04 DIAGNOSIS — R50.9 FEVER, UNSPECIFIED FEVER CAUSE: Primary | ICD-10-CM

## 2024-09-04 DIAGNOSIS — R35.0 URINARY FREQUENCY: ICD-10-CM

## 2024-09-04 LAB
BILIRUB UR QL STRIP: NEGATIVE
CLARITY UR: CLEAR
COLOR UR: YELLOW
CTP QC/QA: YES
CTP QC/QA: YES
GLUCOSE UR QL STRIP: NEGATIVE
HGB UR QL STRIP: NEGATIVE
KETONES UR QL STRIP: NEGATIVE
LEUKOCYTE ESTERASE UR QL STRIP: NEGATIVE
NITRITE UR QL STRIP: NEGATIVE
PH UR STRIP: 7 [PH] (ref 5–8)
POC MOLECULAR INFLUENZA A AGN: NEGATIVE
POC MOLECULAR INFLUENZA B AGN: NEGATIVE
PROT UR QL STRIP: NEGATIVE
SARS-COV-2 RDRP RESP QL NAA+PROBE: NEGATIVE
SP GR UR STRIP: 1.01 (ref 1–1.03)
URN SPEC COLLECT METH UR: NORMAL
UROBILINOGEN UR STRIP-ACNC: NEGATIVE EU/DL

## 2024-09-04 PROCEDURE — 3078F DIAST BP <80 MM HG: CPT | Mod: CPTII,S$GLB,, | Performed by: PHYSICIAN ASSISTANT

## 2024-09-04 PROCEDURE — 81003 URINALYSIS AUTO W/O SCOPE: CPT | Performed by: PHYSICIAN ASSISTANT

## 2024-09-04 PROCEDURE — 3074F SYST BP LT 130 MM HG: CPT | Mod: CPTII,S$GLB,, | Performed by: PHYSICIAN ASSISTANT

## 2024-09-04 PROCEDURE — 87502 INFLUENZA DNA AMP PROBE: CPT | Mod: QW,S$GLB,, | Performed by: PHYSICIAN ASSISTANT

## 2024-09-04 PROCEDURE — 99999 PR PBB SHADOW E&M-EST. PATIENT-LVL III: CPT | Mod: PBBFAC,,, | Performed by: PHYSICIAN ASSISTANT

## 2024-09-04 PROCEDURE — 1160F RVW MEDS BY RX/DR IN RCRD: CPT | Mod: CPTII,S$GLB,, | Performed by: PHYSICIAN ASSISTANT

## 2024-09-04 PROCEDURE — 87635 SARS-COV-2 COVID-19 AMP PRB: CPT | Mod: QW,S$GLB,, | Performed by: PHYSICIAN ASSISTANT

## 2024-09-04 PROCEDURE — 1159F MED LIST DOCD IN RCRD: CPT | Mod: CPTII,S$GLB,, | Performed by: PHYSICIAN ASSISTANT

## 2024-09-04 PROCEDURE — 99213 OFFICE O/P EST LOW 20 MIN: CPT | Mod: S$GLB,,, | Performed by: PHYSICIAN ASSISTANT

## 2024-09-04 PROCEDURE — 3008F BODY MASS INDEX DOCD: CPT | Mod: CPTII,S$GLB,, | Performed by: PHYSICIAN ASSISTANT

## 2024-09-04 NOTE — PROGRESS NOTES
Subjective:      Patient ID: Anita Brock is a 54 y.o. female.    Chief Complaint: Generalized Body Aches (She is here due to body aches all over body but more in her torso area. Also stated that her back was hurting. States she ran a low grade fever yesterday evening. )    Patient is new to me, being seen today for fever and body aches x1day.  Symptoms started yesterday  Treatment includes Tylenol     Last visit Oct 2023 w PCP.       Review of Systems   Constitutional:  Positive for fever (low grade). Negative for appetite change, chills and diaphoresis.   HENT:  Negative for congestion, rhinorrhea and sore throat.    Respiratory:  Negative for cough, shortness of breath and wheezing.    Cardiovascular:  Negative for chest pain and palpitations.   Gastrointestinal:  Negative for abdominal pain, constipation, diarrhea, nausea and vomiting.   Genitourinary:  Positive for frequency. Negative for dysuria and hematuria.   Musculoskeletal:  Positive for back pain and myalgias (generalized body aches).   Skin:  Negative for rash.   Neurological:  Positive for light-headedness (h/o vertigo, this is chronic, no change). Negative for dizziness and headaches.       Objective:   /68 (BP Location: Left arm, Patient Position: Sitting, BP Method: Medium (Manual))   Pulse 75   Temp 97.7 °F (36.5 °C) (Tympanic)   Resp 20   Wt 76.3 kg (168 lb 3.4 oz)   SpO2 99%   BMI 27.15 kg/m²   Physical Exam  Constitutional:       General: She is not in acute distress.     Appearance: She is well-developed. She is not ill-appearing or diaphoretic.   HENT:      Head: Normocephalic and atraumatic.      Right Ear: External ear normal.      Left Ear: External ear normal.   Eyes:      General: Lids are normal.         Right eye: No discharge.         Left eye: No discharge.      Conjunctiva/sclera: Conjunctivae normal.      Right eye: Right conjunctiva is not injected.      Left eye: Left conjunctiva is not injected.   Pulmonary:       Effort: Pulmonary effort is normal. No respiratory distress.   Skin:     General: Skin is warm and dry.      Findings: No rash.   Neurological:      Mental Status: She is alert and oriented to person, place, and time.   Psychiatric:         Speech: Speech normal.         Behavior: Behavior normal.         Thought Content: Thought content normal.         Judgment: Judgment normal.       Assessment:      1. Fever, unspecified fever cause    2. Body aches    3. Urinary frequency       Plan:   Fever, unspecified fever cause  -     POCT COVID-19 Rapid Screening  -     POCT Influenza A/B Molecular  -     Urinalysis, Reflex to Urine Culture Urine, Clean Catch    Body aches    Urinary frequency      Discussed worsening signs/symptoms and when to return to clinic or go to ED.   Patient expresses understanding and agrees with treatment plan.

## 2024-09-10 ENCOUNTER — TELEPHONE (OUTPATIENT)
Dept: INTERNAL MEDICINE | Facility: CLINIC | Age: 54
End: 2024-09-10
Payer: COMMERCIAL

## 2024-09-10 NOTE — TELEPHONE ENCOUNTER
----- Message from Lola Rangel sent at 9/10/2024 12:27 PM CDT -----  Regarding: MRI appointment.  Good morning,     Patient is scheduled for MRI appointment and referral is not approved at this time.  Can you please let me know if this is medically urgent or can it be moved to a later date?    Thanks  Lola   Radiology Scheduler  [phone]

## 2024-09-17 ENCOUNTER — TELEPHONE (OUTPATIENT)
Dept: INTERNAL MEDICINE | Facility: CLINIC | Age: 54
End: 2024-09-17
Payer: COMMERCIAL

## 2024-09-17 NOTE — TELEPHONE ENCOUNTER
----- Message from oLla Rangel sent at 9/17/2024  8:01 AM CDT -----  Regarding: MRI appointment.  Good morning,     Patient is scheduled for MRI appointment and referral is not approved at this time.  Can you please let me know if this is medically urgent or can it be moved to a later date?    Thanks  Lola   Radiology Scheduler  [phone]

## 2024-09-17 NOTE — TELEPHONE ENCOUNTER
Good morning, its not medical urgent. She can come once her insurance approve    If you have any questions call the office at [phone]  Thanks!